# Patient Record
Sex: FEMALE | Race: WHITE | NOT HISPANIC OR LATINO | Employment: FULL TIME | URBAN - METROPOLITAN AREA
[De-identification: names, ages, dates, MRNs, and addresses within clinical notes are randomized per-mention and may not be internally consistent; named-entity substitution may affect disease eponyms.]

---

## 2021-03-03 ENCOUNTER — TREATMENT (OUTPATIENT)
Dept: PHYSICAL THERAPY | Facility: CLINIC | Age: 31
End: 2021-03-03

## 2021-03-03 DIAGNOSIS — R10.2 PELVIC PAIN: ICD-10-CM

## 2021-03-03 DIAGNOSIS — M46.1 SACROILIITIS (HCC): Primary | ICD-10-CM

## 2021-03-03 DIAGNOSIS — R27.9 LACK OF COORDINATION: ICD-10-CM

## 2021-03-03 PROCEDURE — 97162 PT EVAL MOD COMPLEX 30 MIN: CPT | Performed by: PHYSICAL THERAPIST

## 2021-03-03 PROCEDURE — 97112 NEUROMUSCULAR REEDUCATION: CPT | Performed by: PHYSICAL THERAPIST

## 2021-03-03 NOTE — PROGRESS NOTES
PHYSICAL THERAPY INITIAL EVALUATION    Patient: Tosha Thurston   : 1990  Diagnosis/ICD-10 Code:  Sacroiliitis (CMS/[facility]) [M46.1]  Referring practitioner: No ref. provider found  Date of Initial Visit: Type: THERAPY  Noted: 3/3/2021  Today's Date: 3/3/2021  Patient seen for 1 sessions             FEMALE NIH-CPSI 15/44 (higher number indicated increased dysfunction)      Subjective Patient is a 31 year-old reporting a several year history with recent exacerbation of sacroiliac pain and pelvic/perineal pain which is sometimes on left, sometimes on right, episodic in nature, (indicates L PSIS with Alma's sign) and states pain radiates into buttock but denies radicular symptoms, aggravated by sitting; also experiencing pelvic/perineal pain which is aggravated by defecation (states currently straining to move bowels and long term history of constipation), yearly well woman check, and intercourse, currently (prior to pregnancy) not using tampons (per personal preference) but  discomfort with use in past. Pain  3/10 on average. Due to symptoms now presents for PT evaluation. Patient's goal for therapy is to address her pain, have bowel movements more easily, and to be able to participate in recreational wellness exercise without pain.      Medical history reviewed with patient and includes current pregnancy TIBURCIO 21 (12 weeks pregnant at time of eval, states standard prenatal care with [facility], seeing every four weeks, no precautions), additional medical history includes bicuspid aortic valve (followed by cardiologist, states to see 3/5, no current precautions), history of long term constipation. Medication list in chart.   Work/social history: . Lives with  in [facility].    Activity level: exercise videos for Pilates and yoga (modifying for pregnancy); history of dance, competitive cheerleading     Objective     Postural Observations  Slight anterior pelvic tilt, rounded shoulders, and increased lumbar lordosis  in standing, potential R rib hump noted with forward flexion   In sitting, weight shift to R ischium  R ant innom, L posterior innom, negative pubic shotgun   Lumbar AROM: pain in left PSIS with return to extension, pain with flexion but unable to specifiy at where within range, extension and left lateral flexion 50% WFLs due to L PSIS pain.       Palpation    Hypertonic and tenderness left gluteus medius and piriformis.     Passive Range of Motion;  With 90/90 test, B hip er 80d, HS WFLs     Strength/Myotome Testing      Left Hip   Planes of Motion   Flexion:4  Extension: 4  Abduction: 4  Testing reproduces L SIJ pain     Right Hip   Planes of Motion   Flexion: 5  Extension: 5  Abduction: 5        Muscle Activation     Additional Muscle Activation Details  Fair diaphragmatic excursion with relaxed breathing   Fair TA activation upon attempt, overuse substitution from B obliques and respiratory diaphragm      See Exercise, Manual, and Modality Logs for complete treatment.      Assessment & Plan     Assessment  Impairments: abnormal coordination, abnormal muscle firing, abnormal muscle tone, impaired physical strength, lacks appropriate home exercise program and pain with function  Assessment details: Assessment: Patient presents for evaluation and treatment of sacroiliiac and pelvic pain with  today's assessment revealing pelvic positional faults, motor control/coordination deficits, and soft tissue tone/tenderness, all of which are  impacting her function and need to be addressed with skilled physical therapy treatment. Prognosis for PT is good given current musculoskeletal findings and health status; potential impacts to physical therapy plan of care include pregnancy as an evolving condition and underlying cardiac condition (Pt to f/u on upcoming medical consult to determine if POC to be altered/medical  clearance needed).Goals and plan were discussed with the patient with the patient expressing understanding and agreement with the proposed plan of care.          Patient was instructed in clinical findings of today's encounter, short term HEP including diaphragmatic breathing, seated alternating isometric hip extension, movement and ADL guidelines to promote pelvic stability (no increase in pain after today's Rx), goals and plan of care, musculoskeletal and general pregnancy precautions, lifestyle adaptations/habits including avoidance of Valsalva, ADL performance to improve symptoms and improve lumbopelvic stability, and potential and expected response to Rx. Patient expressed understanding of instruction given.   Prognosis: good  Functional Limitations: uncomfortable because of pain and sitting  Goals  Plan Goals: Short Term Goals (to be achieved in 30 days)  1) Outcome measure (Female NIH-CPSI score) score  <12 /44 to represent overall improved function  2) Independent in short term home exercise program and activity modification to improve symptoms and pelvic stability    Long Term Goals (to be achieved within 90 days)   1) Outcome measure Female NIH-CPSI score) score  < 8/44 to represent overall improved function  2) > good lumbopelvic muscle coordination to represent improved function (sitting, self care)  3) Patient to report >40% overall improvement in symptoms  4) Patient independent in long term home exercise program and ADL modifications     Plan  Therapy options: will be seen for skilled physical therapy services  Planned modality interventions: cryotherapy  Planned therapy interventions: abdominal trunk stabilization, manual therapy, motor coordination training, neuromuscular re-education, soft tissue mobilization, spinal/joint mobilization, strengthening, therapeutic activities, home exercise program, joint mobilization, functional ROM exercises and flexibility  Frequency: 1-2X per week.        Visit  Diagnoses:    ICD-10-CM ICD-9-CM   1. Sacroiliitis (CMS/[facility])  M46.1 720.2   2. Pelvic pain  R10.2 [id number]   3. Lack of coordination  R27.9 781.3       Timed:  Manual Therapy:         mins  90786;  Therapeutic Exercise:         mins  46857;     Neuromuscular Anaya:    10    mins  14507;    Therapeutic Activity:          mins  24106;     Gait Training:           mins  75627;     Ultrasound:          mins  61349;    Electrical Stimulation:         mins  04024 ( );    Untimed:  Electrical Stimulation:         mins  95169 ( );  Mechanical Traction:         mins  67223;   Dry Needling          mins self-pay  Traction          mins 87399  Low Eval          Mins  96228  Mod Eval      35    Mins  71578  High Eval                            Mins  80319  Re-Eval                               mins  54873    Timed Treatment:   10   mins   Total Treatment:     45   mins    PT SIGNATURE: RADHA Vega PT, DPT, WCS   DATE TREATMENT INITIATED: 3/3/2021    Initial Certification  Certification Period: 6/1/2021  I certify that the therapy services are furnished while this patient is under my care.  The services outlined above are required by this patient, and will be reviewed every 90 days.     PHYSICIAN: (self referred/direct access)    DATE:     Please sign and return via fax to [phone]. Thank you, [facility].

## 2021-03-10 ENCOUNTER — TREATMENT (OUTPATIENT)
Dept: PHYSICAL THERAPY | Facility: CLINIC | Age: 31
End: 2021-03-10

## 2021-03-10 DIAGNOSIS — R27.9 LACK OF COORDINATION: ICD-10-CM

## 2021-03-10 DIAGNOSIS — M46.1 SACROILIITIS (HCC): Primary | ICD-10-CM

## 2021-03-10 DIAGNOSIS — R10.2 PELVIC PAIN: ICD-10-CM

## 2021-03-10 PROCEDURE — 97110 THERAPEUTIC EXERCISES: CPT | Performed by: PHYSICAL THERAPIST

## 2021-03-10 PROCEDURE — 97140 MANUAL THERAPY 1/> REGIONS: CPT | Performed by: PHYSICAL THERAPIST

## 2021-03-10 PROCEDURE — 97112 NEUROMUSCULAR REEDUCATION: CPT | Performed by: PHYSICAL THERAPIST

## 2021-03-10 NOTE — PROGRESS NOTES
"                                                          PHYSICAL THERAPY DAILY PROGRESS NOTE    Patient: Tosha Thurston   : 1990  Diagnosis/ICD-10 Code:  Sacroiliitis (CMS/[facility]) [M46.1]  Referring practitioner: Self Referring  Date of Initial Visit: Type: THERAPY  Noted: 3/3/2021  Today's Date: 3/10/2021  Patient seen for 2 sessions           Subjective Saw cardiologist, \"everything looks good:, no precautions and \"exercise is good\"; to see midwife next week. States has been doing diaphragmatic breathing and it helps with less straining to move bowels. Left hip pain with activity improved slightly \"but it comes and goes\".Mild pain prior to Rx (indicates L PSIS), improved after Rx.          Objective R ant L post innom, neg pub shot, ttp L glute med and piriformis    See Exercise, Manual, and Modality Logs for complete treatment.     Patient was educated in updated HEP with handouts/supplies provided, review of general pregnancy precautions, expected and potential response to Rx, review of proper transfers, lifting and Valsalva avoidance, potential options for bracing dependent upon symptom progression/improvement, and goals and plan were reviewed with the patient expressing understanding/agreement.      Assessment/Plan Today's treatment focused on pelvic alignment correction and stabilization exercises with patient tolerant to treatment without increase in pain and with post Rx improvement in pain noted. Needs to continue skilled PT to continue progress towards goals and to address symptoms limiting function.    Plan: Progress TA w hip abd, add, rows as indicated, reassess pelvic position and Rx accordingly/as tolerated.       Visit Diagnoses:    ICD-10-CM ICD-9-CM   1. Sacroiliitis (CMS/[facility])  M46.1 720.2   2. Pelvic pain  R10.2 [id number]   3. Lack of coordination  R27.9 781.3       Timed:  Manual Therapy:    20     mins  17477;  Therapeutic Exercise:    8     mins  40638;     Neuromuscular Anaya:   14     mins  " 88107;    Therapeutic Activity:          mins  40090;     Gait Training:           mins  21073;     Ultrasound:          mins  27089;    Electrical Stimulation:         mins  28998 ( );    Untimed:  Electrical Stimulation:         mins  97259 ( );  Mechanical Traction:         mins  41105;   Dry Needling          mins self-pay  Traction          mins 78471  Low Eval          Mins  09858  Mod Eval         Mins  91170  High Eval                            Mins  75037  Re-Eval                               mins  94113    Timed Treatment:  42    mins   Total Treatment:     42   mins    PT SIGNATURE: RADHA Vega PT, DPT, WCS   DATE: 3/10/2021

## 2021-03-10 NOTE — PATIENT INSTRUCTIONS
Access Code: [email or web address]   URL: [email or web address]   Date: 03/10/2021   Prepared by: Radha Vega     Program Notes   Gentle abdominal contraction as you exhale NO PAIN     Exercises   Seated Transversus Abdominis Bracing - 12 reps - 1 sets - 2 hold - 1x daily - 7x weekly   Seated Piriformis Stretch with Trunk Bend - 2 reps - 1 sets - 20 hold - 1x daily - 7x weekly   Seated Hip Abduction with Resistance - 12 reps - 1 sets - 2x daily - 7x weekly

## 2021-03-17 ENCOUNTER — TREATMENT (OUTPATIENT)
Dept: PHYSICAL THERAPY | Facility: CLINIC | Age: 31
End: 2021-03-17

## 2021-03-17 DIAGNOSIS — R27.9 LACK OF COORDINATION: ICD-10-CM

## 2021-03-17 DIAGNOSIS — R10.2 PELVIC PAIN: ICD-10-CM

## 2021-03-17 DIAGNOSIS — M46.1 SACROILIITIS (HCC): Primary | ICD-10-CM

## 2021-03-17 PROCEDURE — 97140 MANUAL THERAPY 1/> REGIONS: CPT | Performed by: PHYSICAL THERAPIST

## 2021-03-17 PROCEDURE — 97112 NEUROMUSCULAR REEDUCATION: CPT | Performed by: PHYSICAL THERAPIST

## 2021-03-17 PROCEDURE — 97110 THERAPEUTIC EXERCISES: CPT | Performed by: PHYSICAL THERAPIST

## 2021-03-17 NOTE — PATIENT INSTRUCTIONS
Access Code: [email or web address]: [email or web address]: 03/17/2021Prepared by: Radha Pink EnsorExerccharles   Seated Shoulder Row with Resistance Anchored at Feet - 1 x daily - 7 x weekly - 1 sets - 12 reps - 2 hold

## 2021-03-17 NOTE — PROGRESS NOTES
PHYSICAL THERAPY DAILY PROGRESS NOTE    Patient: Tosha Thurston   : 1990  Diagnosis/ICD-10 Code:  Sacroiliitis (CMS/HCC) [M46.1]  Referring practitioner: Self Referring  Date of Initial Visit: Type: THERAPY  Noted: 3/3/2021  Today's Date: 3/17/2021  Patient seen for 3 sessions           Subjective Pain with sitting and intercourse pain improved, 3/10 and not occurring as often as prior to attending PT, no problems with exercises, some pain in (points to PSIS) with driving.Saw midwife two days, returns in 4 weeks and to undergo standard US, 14 weeks today.           Objective R ant L post innom, neg pub shot, ttp L glute med and piriformis. Pain with resisted L hip abduction (concordant sign) prior to Rx, no pain after Rx.  See Exercise, Manual, and Modality Logs for complete treatment.     Patient was educated in updated HEP with handouts/supplies provided, expected and potential response to Rx, possible adaptations to car seat  and goals and plan were reviewed with the patient expressing understanding/agreement. Instructed in/reviewed proper activation/position/pattern with transitions, work activities, and ADLs, including proper sitting posture. Also discussed potnential indications for Serola/SI brace depending upon progress/symptom improvement.         Assessment/Plan Today's treatment focused on addressing pelvic positional faults and soft tissue contributions to pain and progression of stabilizing exercises with patient tolerant to treatment without increase in pain and with improved post vs pre test signs noted. Needs to continue skilled PT to continue progress towards goals and to address symptoms limiting function.     Plan: Progress to standing core stab as tolerated.     Visit Diagnoses:    ICD-10-CM ICD-9-CM   1. Sacroiliitis (CMS/HCC)  M46.1 720.2   2. Pelvic pain  R10.2 [id number]   3. Lack of coordination  R27.9 781.3       Timed:  Manual  Therapy:   18     mins  26519;  Therapeutic Exercise:    8     mins  83838;     Neuromuscular Anaya:    18    mins  58754;    Therapeutic Activity:          mins  57358;     Gait Training:           mins  27413;     Ultrasound:          mins  68529;    Electrical Stimulation:         mins  94095 ( );    Untimed:  Electrical Stimulation:         mins  09668 ( );  Mechanical Traction:         mins  98364;   Dry Needling          mins self-pay  Traction          mins 60853  Low Eval          Mins  82910  Mod Eval          Mins  60504  High Eval                            Mins  17355  Re-Eval                               mins  13520    Timed Treatment:  46    mins   Total Treatment:    46    mins    PT SIGNATURE: RADHA Vega, PT, DPT, WCS   DATE: 3/17/2021

## 2021-03-24 ENCOUNTER — TREATMENT (OUTPATIENT)
Dept: PHYSICAL THERAPY | Facility: CLINIC | Age: 31
End: 2021-03-24

## 2021-03-24 DIAGNOSIS — M46.1 SACROILIITIS (HCC): Primary | ICD-10-CM

## 2021-03-24 DIAGNOSIS — R27.9 LACK OF COORDINATION: ICD-10-CM

## 2021-03-24 DIAGNOSIS — R10.2 PELVIC PAIN: ICD-10-CM

## 2021-03-24 PROCEDURE — 97112 NEUROMUSCULAR REEDUCATION: CPT | Performed by: PHYSICAL THERAPIST

## 2021-03-24 PROCEDURE — 97140 MANUAL THERAPY 1/> REGIONS: CPT | Performed by: PHYSICAL THERAPIST

## 2021-03-24 PROCEDURE — 97110 THERAPEUTIC EXERCISES: CPT | Performed by: PHYSICAL THERAPIST

## 2021-03-24 NOTE — PATIENT INSTRUCTIONS
Access Code: [email or web address]: [email or web address]/Date: 03/24/2021Prepared by: Radha Pink EnsorProgram Notes   NO PAIN. slight posterior pelvic tilt and engage abdominals and glutes prior to movement.   For split stance, front foot heel aligned with back foot toes, feet hip width apart   Exercises   Split Stance Shoulder Row with Resistance - 1 x daily - 7 x weekly - 1 sets - 12 reps   Standing Single Arm Low Row with Anchored Resistance - 1 x daily - 7 x weekly - 1 sets - 12 reps

## 2021-03-24 NOTE — PROGRESS NOTES
"Physical Therapy Daily Progress Note      Patient: Tosha Thurston   : 1990  Referring practitioner: Self Referring  Date of Initial Visit: Type: THERAPY  Noted: 3/3/2021  Today's Date: 3/24/2021  Patient seen for 4 sessions         Subjective: States not having to push and strain as much with bowel movements, can do exercises to address pain, still having pain with intercourse \"maybe little better\". 15 weeks gestation today.       Objective R ant L post innom, neg pub shot, ttp L glute med and piriformis  See Exercise, Manual, and Modality Logs for complete treatment.     Patient was educated in updated HEP with handouts/supplies provided, expected and potential response to Rx, and goals and plan were reviewed with the patient expressing understanding/agreement. Instructed in/reviewed proper transition/transfer technique and reviewed pregnancy precautions with patient expressing understanding/agreement    A: Today's treatment focused on progression of stability/motor control/postural exercises with patient tolerant to treatment without increase in pain and with  proper activation/sequencing/technique noted.Needs to continue skilled PT to continue progress towards goals and to address symptoms limiting function.     Plan: Progress per Plan of Care ; plan to progress to anti rotation/Paloff progression and add pec stretch next Rx            Timed:         Manual Therapy:    15     mins  23633;     Therapeutic Exercise:    8     mins  93951;     Neuromuscular Anaya:   20    mins  78427;    Therapeutic Activity:          mins  39627;     Gait Training:           mins  60118;     Ultrasound:          mins  60330;    Ionto                                   mins   99695  Self Care                            mins   00985      Un-Timed:  Electrical Stimulation:         mins  76361 ( );  Dry Needling          mins self-pay  Traction          mins 04782  Low Eval          Mins  05692  Mod Eval          Mins  " 82227  High Eval                            Mins  93213  Re-Eval                               mins  69450          Canalith Repos                   mins  60732    Timed Treatment:  43    mins   Total Treatment:    43    mins    RADHA Vega PT, DPT, WCS  Physical Therapist

## 2021-03-31 ENCOUNTER — TREATMENT (OUTPATIENT)
Dept: PHYSICAL THERAPY | Facility: CLINIC | Age: 31
End: 2021-03-31

## 2021-03-31 DIAGNOSIS — M46.1 SACROILIITIS (HCC): Primary | ICD-10-CM

## 2021-03-31 DIAGNOSIS — R27.9 LACK OF COORDINATION: ICD-10-CM

## 2021-03-31 DIAGNOSIS — R10.2 PELVIC PAIN: ICD-10-CM

## 2021-03-31 PROCEDURE — 97112 NEUROMUSCULAR REEDUCATION: CPT | Performed by: PHYSICAL THERAPIST

## 2021-03-31 PROCEDURE — 97140 MANUAL THERAPY 1/> REGIONS: CPT | Performed by: PHYSICAL THERAPIST

## 2021-03-31 PROCEDURE — 97110 THERAPEUTIC EXERCISES: CPT | Performed by: PHYSICAL THERAPIST

## 2021-03-31 NOTE — PROGRESS NOTES
Physical Therapy Re-Assessment / Re-Certification        Patient: Tosha Thurston   : 1990  Diagnosis/ICD-10 Code:  Sacroiliitis (CMS/[facility]) [M46.1]  Referring practitioner: Self Referring  Date of Initial Visit: Type: THERAPY  Noted: 3/3/2021  Today's Date: 3/31/2021  Patient seen for 5 sessions      Subjective:     Subjective Questionnaire: FEMALE NIH-CPSI 17/44 (higher number indicated increased dysfunction)  Clinical Progress: improved  Home Program Compliance: Yes  Treatment has included: therapeutic exercise, neuromuscular re-education, manual therapy and therapeutic activity self care/pt education    Subjective States low back (sacroiliac) pain is present after activity and sometimes if she sits too long, 3/10 when present, but sometimes there is not pain and can do exercises as instructed to address pain. States due to scheduling conflicts did not have time to do exercises as often as she should this past week. No changes re: status of pregnancy, to see midwife group in 2 weeks. 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 Agreeable to proposed plan of care.         Objective   Postural Observations:  Positive Stork L, Posterior, R anterior innom, neg pubic shotgun     Palpation    Hypertonic and tenderness left gluteus medius and piriformis.     Passive Range of Motion;  With 90/90 test, B hip er 80d, HS WFLs      Strength/Myotome Testing      Left Hip   Planes of Motion   Extension: 5  Abduction: 5  Testing of abduction reproduces L SIJ pain     Right Hip   Planes of Motion   Flexion: 5  Extension: 5  Abduction: 5        Muscle Activation     Additional Muscle Activation Details  Fair diaphragmatic excursion with relaxed breathing   Fair TA activation upon attempt, overuse substitution from B obliques and respiratory diaphragm     See Exercise, Manual, and Modality Logs for complete treatment.     Pt Ed: Pt was instructed in results of today's reassessmentupdated  HEP (seated QL, table to cat qped, qped TA), reviewed pregnancy precautions, activity and exercise guidelines, expected and potential response to Rx, and review of plan of care and goals. Pt expressed understanding of instruction given.     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 She demonstrates improved strength and motor control but needs to continue PT to address pelvic positional faults and pain limiting activity tolerance.with additional time needed to meet goals due to evolving changing condition (pregnancy) affecting musculoskeletal structures and potentially impacting progress. See goal status below. Goals and plan were discussed with the patient with the patient expressing understanding/agreement.    Visit Diagnoses:    ICD-10-CM ICD-9-CM   1. Sacroiliitis (CMS/[facility])  M46.1 720.2   2. Pelvic pain  R10.2 [id number]   3. Lack of coordination  R27.9 781.3       Progress toward previous goals: Partially Met    Goals  Short Term Goals (to be achieved in 30 days)  1) Outcome measure (Female NIH-CPSI score) score  <12 /44 to represent overall improved function 17/44 as of 3/31/21  2) Independent in short term home exercise program and activity modification to improve symptoms and pelvic stability MET    Long Term Goals (to be achieved within 90 days)   1) Outcome measure Female NIH-CPSI score) score  < 8/44 to represent overall improved function- 17/44 as of 3/31/21  2) > good lumbopelvic muscle coordination to represent improved function (sitting, self care)- improved  3) Patient to report >40% overall improvement in symptoms- needs additional time to meet goal   4) Patient independent in long term home exercise program and ADL modifications - needs additional time to meet goal        Recommendations: Continue as planned  Timeframe: 1 month; plan then to be reassessed  Prognosis to achieve goals: good    PT Signature: RADHA Vega, PT , DPT, WCS      Based upon review of the patient's progress and continued therapy plan, it is my medical opinion that Tosha Thurston should continue physical therapy treatment at [facility]  [address] [facility], [address] [address] [phone].    Signature: __________________________________  Referring, Self    Timed:  Manual Therapy:    18     mins  03993;  Therapeutic Exercise:     8    mins  92982;     Neuromuscular Anaya:   15     mins  05779;    Therapeutic Activity:          mins  80942;     Gait Training:           mins  76746;     Ultrasound:          mins  93921;    Electrical Stimulation:         mins  63953 ( );  Ultrasound:          mins  88322;    Ionto                                   mins   68717  Self Care                            mins   17910    Untimed:  Electrical Stimulation:         mins  43277 ( );  Mechanical Traction:         mins  06634;   Dry Needling          mins self-pay  Low Eval          Mins  08846  Mod Eval          Mins  81925  High Eval                            Mins  97935  Re-Eval                               mins  11004    Timed Treatment: 41     mins   Total Treatment:    41    mins

## 2021-04-07 ENCOUNTER — TREATMENT (OUTPATIENT)
Dept: PHYSICAL THERAPY | Facility: CLINIC | Age: 31
End: 2021-04-07

## 2021-04-07 DIAGNOSIS — R10.2 PELVIC PAIN: ICD-10-CM

## 2021-04-07 DIAGNOSIS — M46.1 SACROILIITIS (HCC): Primary | ICD-10-CM

## 2021-04-07 DIAGNOSIS — R27.9 LACK OF COORDINATION: ICD-10-CM

## 2021-04-07 PROCEDURE — 97140 MANUAL THERAPY 1/> REGIONS: CPT | Performed by: PHYSICAL THERAPIST

## 2021-04-07 PROCEDURE — 97110 THERAPEUTIC EXERCISES: CPT | Performed by: PHYSICAL THERAPIST

## 2021-04-07 PROCEDURE — 97112 NEUROMUSCULAR REEDUCATION: CPT | Performed by: PHYSICAL THERAPIST

## 2021-04-07 NOTE — PROGRESS NOTES
PHYSICAL THERAPY DAILY PROGRESS NOTE    Patient: Tosha Thurston   : 1990  Diagnosis/ICD-10 Code:  Sacroiliitis (CMS/[facility]) [M46.1]  Referring practitioner: Self Referring  Date of Initial Visit: Type: THERAPY  Noted: 3/3/2021  Today's Date: 2021  Patient seen for 6 sessions           Subjective States pain has been better, had questions about sleeping position; expressed understanding of instruction given. States had occassional (indicates R PSIS area)  Pain during past week, pain curently mild pain L hip pain (points to PSIS and L hip flexor) prior to Rx, reports 75% improvement after Rx.         Objective R ant innom, L post innom, negative pubic shotgun    See Exercise, Manual, and Modality Logs for complete treatment.     Patient was educated in updated HEP(added resisted clam in/outflare correction, qped w LE ext, seated hip flexor stretch)  with handouts/supplies provided, expected and potential response to Rx, potential sleeping positions, review of pregnancy precautions and ADL and exercise guidelines, indication for bracing, and goals and plan were reviewed with the patient expressing understanding/agreement.     Assessment/Plan Today's treatment focused on addressing positional faults and progression of stabilizing exercises with patient tolerant to treatment without increase in pain and with improved post vs preRx pain noted. Needs to continue skilled PT to continue progress towards goals and to address symptoms limiting function.    Plan: Continue to progress qped series, progress to standing, add inflare outflare correction as needed      Visit Diagnoses:    ICD-10-CM ICD-9-CM   1. Sacroiliitis (CMS/HCC)  M46.1 720.2   2. Pelvic pain  R10.2 [id number]   3. Lack of coordination  R27.9 781.3       Timed:  Manual Therapy:    11     mins  80123;  Therapeutic Exercise:    11     mins  49861;     Neuromuscular Anaya:    22    mins  15489;     Therapeutic Activity:          mins  52621;     Gait Training:           mins  20873;     Ultrasound:          mins  13744;    Electrical Stimulation:         mins  95084 ( );    Untimed:  Electrical Stimulation:         mins  01940 ( );  Mechanical Traction:         mins  67098;   Dry Needling          mins self-pay  Traction          mins 04863  Low Eval          Mins  24904  Mod Eval          Mins  34599  High Eval                            Mins  28849  Re-Eval                               mins  90260    Timed Treatment:  44    mins   Total Treatment:    44    mins    PT SIGNATURE: RADHA Vega PT, DPT, WCS   DATE: 4/7/2021

## 2021-04-14 ENCOUNTER — TREATMENT (OUTPATIENT)
Dept: PHYSICAL THERAPY | Facility: CLINIC | Age: 31
End: 2021-04-14

## 2021-04-14 DIAGNOSIS — R27.9 LACK OF COORDINATION: ICD-10-CM

## 2021-04-14 DIAGNOSIS — R10.2 PELVIC PAIN: ICD-10-CM

## 2021-04-14 DIAGNOSIS — M46.1 SACROILIITIS (HCC): Primary | ICD-10-CM

## 2021-04-14 PROCEDURE — 97112 NEUROMUSCULAR REEDUCATION: CPT | Performed by: PHYSICAL THERAPIST

## 2021-04-14 PROCEDURE — 97140 MANUAL THERAPY 1/> REGIONS: CPT | Performed by: PHYSICAL THERAPIST

## 2021-04-14 PROCEDURE — 97110 THERAPEUTIC EXERCISES: CPT | Performed by: PHYSICAL THERAPIST

## 2021-04-16 ENCOUNTER — BULK ORDERING (OUTPATIENT)
Dept: CASE MANAGEMENT | Facility: OTHER | Age: 31
End: 2021-04-16

## 2021-04-16 DIAGNOSIS — Z23 IMMUNIZATION DUE: ICD-10-CM

## 2021-04-21 ENCOUNTER — TREATMENT (OUTPATIENT)
Dept: PHYSICAL THERAPY | Facility: CLINIC | Age: 31
End: 2021-04-21

## 2021-04-21 DIAGNOSIS — R27.9 LACK OF COORDINATION: ICD-10-CM

## 2021-04-21 DIAGNOSIS — R10.2 PELVIC PAIN: ICD-10-CM

## 2021-04-21 DIAGNOSIS — M46.1 SACROILIITIS (HCC): Primary | ICD-10-CM

## 2021-04-21 PROCEDURE — 97110 THERAPEUTIC EXERCISES: CPT | Performed by: PHYSICAL THERAPIST

## 2021-04-21 PROCEDURE — 97535 SELF CARE MNGMENT TRAINING: CPT | Performed by: PHYSICAL THERAPIST

## 2021-04-21 PROCEDURE — 97140 MANUAL THERAPY 1/> REGIONS: CPT | Performed by: PHYSICAL THERAPIST

## 2021-04-21 PROCEDURE — 97112 NEUROMUSCULAR REEDUCATION: CPT | Performed by: PHYSICAL THERAPIST

## 2021-04-21 NOTE — PROGRESS NOTES
"                                                          PHYSICAL THERAPY DAILY PROGRESS NOTE    Patient: Tosha Thurston   : 1990  Diagnosis/ICD-10 Code:  Sacroiliitis (CMS/HCC) [M46.1]  Referring practitioner: Self Referring  Date of Initial Visit: Type: THERAPY  Noted: 3/3/2021  Today's Date: 2021  Patient seen for 8 sessions             Subjective Saw midwife \"everything looks good\", had US and to have repeat US to look at placental placement and to look at chromosomes (as a precaution per pt report), no limitations or changes in activity. States having hip pain (indicates L anterior hip) in certain positions when tryng to sleep but can do stretch as instructed to address, pain overall improved.           Objective   19 weeks gestation   R ant L posterior innom, ttp L glute med, pain reproduced with resisted hip abduction prior to rx, no pain after  See Exercise, Manual, and Modality Logs for complete treatment.     Patient and PT reviewed general pregnancy precautions, sleep position, techniques to manage symptoms, and ideal posture/correction of postural faults, proper defecation and urination position/technique, and expected and potential response to Rx, and goals and plan were reviewed with the patient expressing understanding/agreement.   Assessment/Plan Improved post vs pre Rx pain and tolerant to treatment without exarcerbation of pain; needs to continue skilled PT to continue to address symptoms and motor control deficits secondary to/impacted by pregnancy.  Plan: Progress qped and to standing next Rx.    Visit Diagnoses:    ICD-10-CM ICD-9-CM   1. Sacroiliitis (CMS/HCC)  M46.1 720.2   2. Pelvic pain  R10.2 [id number]   3. Lack of coordination  R27.9 781.3       Timed:  Manual Therapy:    18     mins  13532;  Therapeutic Exercise:    8     mins  30964;     Neuromuscular Anaya:   17     mins  19634;    Therapeutic Activity:          mins  59932;     Gait Training:           mins  44463;   "   Ultrasound:          mins  04255;    Electrical Stimulation:         mins  62745 ( );  Self-care  __8__ mins 03800    Untimed:  Electrical Stimulation:         mins  72474 ( );  Mechanical Traction:         mins  83614;   Dry Needling          mins self-pay  Traction          mins 83774  Low Eval          Mins  84004  Mod Eval          Mins  95946  High Eval                            Mins  57111  Re-Eval                               mins  03111    Timed Treatment:  51    mins   Total Treatment:    51    mins    PT SIGNATURE: RADHA Vega PT, DPT, WCS   DATE: 4/21/2021

## 2021-04-28 ENCOUNTER — TREATMENT (OUTPATIENT)
Dept: PHYSICAL THERAPY | Facility: CLINIC | Age: 31
End: 2021-04-28

## 2021-04-28 DIAGNOSIS — R27.9 LACK OF COORDINATION: ICD-10-CM

## 2021-04-28 DIAGNOSIS — R10.2 PELVIC PAIN: ICD-10-CM

## 2021-04-28 DIAGNOSIS — M46.1 SACROILIITIS (HCC): Primary | ICD-10-CM

## 2021-04-28 PROCEDURE — 97140 MANUAL THERAPY 1/> REGIONS: CPT | Performed by: PHYSICAL THERAPIST

## 2021-04-28 PROCEDURE — 97110 THERAPEUTIC EXERCISES: CPT | Performed by: PHYSICAL THERAPIST

## 2021-04-28 PROCEDURE — 97112 NEUROMUSCULAR REEDUCATION: CPT | Performed by: PHYSICAL THERAPIST

## 2021-04-28 NOTE — PATIENT INSTRUCTIONS
Access Code: [email or web address]: [email or web address]: 04/28/2021Prepared by: Radha Pink EnsorExerccharles   Corner Pec Major Stretch - 2 x daily - 4 x weekly - 1 sets - 2 reps - 20 hold   Seated Shoulder Horizontal Abduction with Resistance - 1 x daily - 4 x weekly - 1 sets - 12 reps - 2 hold

## 2021-04-28 NOTE — PROGRESS NOTES
PT Re-Assessment / Re-Certification        Patient: Tosha Thurston   : 1990  Diagnosis/ICD-10 Code:  Sacroiliitis (CMS/HCC) [M46.1]  Referring practitioner: Self Referring  Date of Initial Visit: Type: THERAPY  Noted: 3/3/2021  Today's Date: 2021  Patient seen for 9 sessions      Subjective:     Subjective Questionnaire: FEMALE NIH-CPSI: 18/44 (higher number indicated increased dysfunction)  Clinical Progress: improved  Home Program Compliance: Yes  Treatment has included: therapeutic exercise, neuromuscular re-education, manual therapy and therapeutic activity self care/pt education    Subjective No new news/report re: medical status (has not seen midwife group) since prior PT appointment. To see midwife next week and to undergo fetal US and maternal US (due to maternal cardiac hx).  Feels hip/pelvic/low back pain is improving, when present is more on L vs R, less pain moving between sitting to standing, occasional constipation but can position and use breathing techniques to avoid pushing and straining, less pain when trying to find a sleeping position; overall 25% improved. Agreeable to proposed plan of care.       Objective 20 weeks gestation  Postural Observations:  Lumbar AROM WFL's except R lat flexion tight at end range   L Posterior, R anterior innom, neg pubic shotgun     Palpation    Hypertonic and tenderness left gluteus medius and piriformis.     Strength/Myotome Testing      Left Hip   Planes of Motion   Flexion: 5  Extension: 5  Abduction: 5       Right Hip   Planes of Motion   Flexion: 5  Extension: 5  Abduction: 5        Muscle Activation     Additional Muscle Activation Details  Fair diaphragmatic excursion with relaxed breathing   Good TA activation upon attemp     See Exercise, Manual, and Modality Logs for complete treatment.      Pt Ed: Pt was instructed in results of today's reassessmentupdated HEP (doorway/corner pec stretch and horiz sh abd), reviewed pregnancy precautions, activity  and exercise guidelines and monitoring for tolerance by RPE, expected and potential response to Rx, options for bracing, and review of plan of care and goals. Pt expressed understanding of instruction given.     Assessment/Plan     Patient is attending weekly PT to address pregnancy related pain with a positive response as evidenced by her improved pain, improved activity tolerance, and improved motor control and strength. She needs to continue skilled PT to address pelvic positional faults and pain limiting activity tolerance.with additional time needed to meet goals due to evolving changing condition (pregnancy) affecting musculoskeletal structures and potentially impacting progress. See goal status below. Goals and plan were discussed with the patient with the patient expressing understanding/agreement      Visit Diagnoses:    ICD-10-CM ICD-9-CM   1. Sacroiliitis (CMS/HCC)  M46.1 720.2   2. Pelvic pain  R10.2 [id number]   3. Lack of coordination  R27.9 781.3       Progress toward previous goals: Partially Met    Goals  Short Term Goals (to be achieved in 30 days)  1) Outcome measure (Female NIH-CPSI score) score  <12 /44 to represent overall improved function 18/44 as of 3/31/21  2) Independent in short term home exercise program and activity modification to improve symptoms and pelvic stability MET    Long Term Goals (to be achieved within 90 days)   1) Outcome measure Female NIH-CPSI score) score  < 8/44 to represent overall improved function- 18/44 as of 3/31/21  2) > good lumbopelvic muscle coordination to represent improved function (sitting, self care)- progressing  3) Patient to report >40% overall improvement in symptoms- progressing, 25% as of 4/28/21   4) Patient independent in long term home exercise program and ADL modifications - needs additional time to meet goal       Recommendations: Continue as planned; plan to be adjusted depending upon status/prgress, progress to standing hip/core stabilization as  able/tolerated  Timeframe: 1 month  Prognosis to achieve goals: good    PT Signature: RADHA Vega, PT , DPT, WCS      Based upon review of the patient's progress and continued therapy plan, it is my medical opinion that Tosha Thurston should continue physical therapy treatment at [facility]  [address] [place] [address]  [phone].    Signature: __________________________________  Referring, Self    Timed:  Manual Therapy:    19     mins  53383;  Therapeutic Exercise:    8     mins  98731;     Neuromuscular Anaya:    15    mins  31340;    Therapeutic Activity:          mins  43161;     Gait Training:           mins  26329;     Ultrasound:          mins  90817;    Electrical Stimulation:         mins  81884 ( );  Ultrasound:          mins  64088;    Ionto                                   mins   55850  Self Care                            mins   95067    Untimed:  Electrical Stimulation:         mins  46318 ( );  Mechanical Traction:         mins  39901;   Dry Needling          mins self-pay  Low Eval          Mins  17068  Mod Eval          Mins  88433  High Eval                            Mins  89942  Re-Eval                               mins  63133    Timed Treatment:  43    mins   Total Treatment:    43    mins

## 2021-05-12 ENCOUNTER — TREATMENT (OUTPATIENT)
Dept: PHYSICAL THERAPY | Facility: CLINIC | Age: 31
End: 2021-05-12

## 2021-05-12 DIAGNOSIS — M46.1 SACROILIITIS (HCC): Primary | ICD-10-CM

## 2021-05-12 DIAGNOSIS — R10.2 PELVIC PAIN: ICD-10-CM

## 2021-05-12 DIAGNOSIS — R27.9 LACK OF COORDINATION: ICD-10-CM

## 2021-05-12 PROCEDURE — 97140 MANUAL THERAPY 1/> REGIONS: CPT | Performed by: PHYSICAL THERAPIST

## 2021-05-12 PROCEDURE — 97112 NEUROMUSCULAR REEDUCATION: CPT | Performed by: PHYSICAL THERAPIST

## 2021-05-12 PROCEDURE — 97110 THERAPEUTIC EXERCISES: CPT | Performed by: PHYSICAL THERAPIST

## 2021-05-12 NOTE — PROGRESS NOTES
"                                                          PHYSICAL THERAPY DAILY PROGRESS NOTE    Patient: Tosha Thurston   : 1990  Diagnosis/ICD-10 Code:  Sacroiliitis (CMS/HCC) [M46.1]  Referring practitioner: Self Referring  Date of Initial Visit: Type: THERAPY  Noted: 3/3/2021  Today's Date: 2021  Patient seen for 10 sessions             Subjective Had fetal echo, \"heart looks good\" and saw midwife, progressing well and \"everything looks good\"; pain with intercourse and ADLs, has been good, 1-2/10 on average and able to sleep without pain, no new guidelines or restrictions. 22 weeks gestation.           Objective mild R ant innom    See Exercise, Manual, and Modality Logs for complete treatment.     Patient was educated in updated HEP (added PPT standing, gastrocs stretch and diagonals to horiz abd sh) expected and potential response to Rx, and goals and plan were reviewed with the patient expressing understanding/agreement. Instructed in/reviewed proper pelvic position/bracing with with work activities, ADLs, and self care..      Assessment/Plan Improvement in pain and tolerant to exercise progression; needs to continue skilled PT to continue progress towards goals and to address symptoms limiting function with plan to be reevaluated based on progress/status and adjusted as needed due to evolving status (pregnancy) .  Plan: Progress scap stab/postural exercises as indicated     Visit Diagnoses:    ICD-10-CM ICD-9-CM   1. Sacroiliitis (CMS/HCC)  M46.1 720.2   2. Pelvic pain  R10.2 [id number]   3. Lack of coordination  R27.9 781.3       Timed:  Manual Therapy:    12     mins  91305;  Therapeutic Exercise:    12     mins  40074;     Neuromuscular Anaya:   20     mins  21772;    Therapeutic Activity:          mins  30889;     Gait Training:           mins  36798;     Ultrasound:          mins  04906;    Electrical Stimulation:         mins  83788 ( );    Untimed:  Electrical Stimulation:         mins  " 98941 ( );  Mechanical Traction:         mins  00930;   Dry Needling          mins self-pay  Traction          mins 10811  Low Eval          Mins  46771  Mod Eval          Mins  75830  High Eval                            Mins  16577  Re-Eval                               mins  15053    Timed Treatment:  44    mins   Total Treatment:    44    mins    PT SIGNATURE: RADHA Vega, PT, DPT, WCS   DATE: 5/12/2021

## 2021-05-19 ENCOUNTER — TREATMENT (OUTPATIENT)
Dept: PHYSICAL THERAPY | Facility: CLINIC | Age: 31
End: 2021-05-19

## 2021-05-19 DIAGNOSIS — M46.1 SACROILIITIS (HCC): Primary | ICD-10-CM

## 2021-05-19 DIAGNOSIS — R27.9 LACK OF COORDINATION: ICD-10-CM

## 2021-05-19 DIAGNOSIS — R10.2 PELVIC PAIN: ICD-10-CM

## 2021-05-19 PROCEDURE — 97110 THERAPEUTIC EXERCISES: CPT | Performed by: PHYSICAL THERAPIST

## 2021-05-19 PROCEDURE — 97112 NEUROMUSCULAR REEDUCATION: CPT | Performed by: PHYSICAL THERAPIST

## 2021-05-19 PROCEDURE — 97140 MANUAL THERAPY 1/> REGIONS: CPT | Performed by: PHYSICAL THERAPIST

## 2021-05-19 NOTE — PATIENT INSTRUCTIONS
Access Code: [email or web address]: [email or web address]: 05/19/2021Prepared by: Radha Pink EnsorExercises   Seated Cervical Retraction - 3 x daily - 7 x weekly - 1 sets - 5 reps - 2 hold

## 2021-05-19 NOTE — PROGRESS NOTES
PHYSICAL THERAPY DAILY PROGRESS NOTE    Patient: Tosha Thurston   : 1990  Diagnosis/ICD-10 Code:  Sacroiliitis (CMS/HCC) [M46.1]  Referring practitioner: Self Referring  Date of Initial Visit: Type: THERAPY  Noted: 3/3/2021  Today's Date: 2021  Patient seen for 11 sessions           Subjective Pain is overall better, less often and less severe, and mostly in PSIS; feels pain is related to sitting position at work, expressed understanding of instruction given. 23 weeks gestation, no medical appointments since last visit, has US today to assess placental position.       Objective R ant L post innom, pubic symphysis shotgun negative.    See Exercise, Manual, and Modality Logs for complete treatment.     Patient was educated in updated HEP with handouts/supplies provided, expected and potential response to Rx, and goals and plan were reviewed with the patient expressing understanding/agreement. Instructed in/reviewed proper sitting posture with work activities and ADLs.      Assessment/Plan Continues to report improved pain levels overall; today's treatment focused on pelvic and head/shoulder/thoracic positional faults with patient tolerant to treatment without increase in pain. Needs to continue skilled PT to continue progress towards goals and to address symptoms limiting function.    Plan: Reassess pelvic position and chin tucks, progress PB as shane.       Visit Diagnoses:    ICD-10-CM ICD-9-CM   1. Sacroiliitis (CMS/HCC)  M46.1 720.2   2. Pelvic pain  R10.2 [id number]   3. Lack of coordination  R27.9 781.3       Timed:  Manual Therapy:    16     mins  23123;  Therapeutic Exercise:    8     mins  91070;     Neuromuscular Anaya:    20    mins  53313;    Therapeutic Activity:          mins  58817;     Gait Training:           mins  32015;     Ultrasound:          mins  25436;    Electrical Stimulation:         mins  09326 ( );    Untimed:  Electrical  Stimulation:         mins  59452 ( );  Mechanical Traction:         mins  89834;   Dry Needling          mins self-pay  Traction          mins 87303  Low Eval          Mins  19588  Mod Eval          Mins  43827  High Eval                            Mins  39010  Re-Eval                               mins  00449    Timed Treatment:  44    mins   Total Treatment:    44    mins    PT SIGNATURE: RADHA Vega, PT, DPT, WCS   DATE: 5/19/2021

## 2021-05-26 ENCOUNTER — TREATMENT (OUTPATIENT)
Dept: PHYSICAL THERAPY | Facility: CLINIC | Age: 31
End: 2021-05-26

## 2021-05-26 DIAGNOSIS — R10.2 PELVIC PAIN: ICD-10-CM

## 2021-05-26 DIAGNOSIS — M46.1 SACROILIITIS (HCC): Primary | ICD-10-CM

## 2021-05-26 DIAGNOSIS — R27.9 LACK OF COORDINATION: ICD-10-CM

## 2021-05-26 PROCEDURE — 97110 THERAPEUTIC EXERCISES: CPT | Performed by: PHYSICAL THERAPIST

## 2021-05-26 PROCEDURE — 97112 NEUROMUSCULAR REEDUCATION: CPT | Performed by: PHYSICAL THERAPIST

## 2021-05-26 PROCEDURE — 97140 MANUAL THERAPY 1/> REGIONS: CPT | Performed by: PHYSICAL THERAPIST

## 2021-05-26 NOTE — PROGRESS NOTES
PT Re-Assessment / Progress Update        Patient: Tosha Thurston   : 1990  Diagnosis/ICD-10 Code:  Sacroiliitis (CMS/HCC) [M46.1]  Referring practitioner: Self Referring  Date of Initial Visit: Type: THERAPY  Noted: 3/3/2021  Today's Date: 2021  Patient seen for 12 sessions      Subjective:     Subjective Questionnaire: FEMALE NIH-CPSI: 6/44 (higher number indicated increased dysfunction)  Clinical Progress: improved  Home Program Compliance: Yes  Treatment has included: therapeutic exercise, neuromuscular re-education, manual therapy and therapeutic activity self care/pt education    Subjective Better able to find a position of comfort for sleep, pain with intercourse,  ADLS, and sleep improved, overall 80% improved. Agreeable to proposed plan of care (to continue PT towards current goals).  Objective   24 weeks gestation; TIBURCIO 21  Postural Observations:  Mod forward head/rounded shoulder  Ant pelvic tilt/increased lumbar lordosis (consistent with pregnancy)   Lumbar AROM WFL's; does not produce pain   R ant, L post innom  Palpation    Hypertonic (mild) and tenderness left gluteus medius and piriformis.     Strength/Myotome Testing      Left Hip   Planes of Motion   Flexion: 5  Extension: 5  Abduction: 5        Right Hip   Planes of Motion   Flexion: 5  Extension: 5  Abduction: 5        Muscle Activation     Additional Muscle Activation Details  Fair diaphragmatic excursion with relaxed breathing   Good TA activation upon attempt     See Exercise, Manual, and Modality Logs for complete treatment.      Pt Ed: Pt was instructed in results of today's reassessmentupdated HEP (doorway/corner pec stretch and horiz sh abd), reviewed pregnancy precautions, activity and exercise guidelines and monitoring for tolerance by RPE, expected and potential response to Rx, options for bracing, and review of plan of care, goal status, and upcoming goals. Pt expressed understanding of instruction given.     .    Assessment/Plan  Extremely positive response to PT intervention with decreased pain, improved activity tolernace, and improved motor control; needs to continue skilled PT to address pelvic positional faults and pain limiting activity tolerance with plan to be reassessed due to evolving changing condition (pregnancy) affecting musculoskeletal structures and potentially impacting progress. See goal status below. Goals and plan were discussed with the patient with the patient expressing understanding/agreement  Plan:    Visit Diagnoses:    ICD-10-CM ICD-9-CM   1. Sacroiliitis (CMS/[facility])  M46.1 720.2   2. Pelvic pain  R10.2 [id number]   3. Lack of coordination  R27.9 781.3       Progress toward previous goals: Partially Met    Goals  Goals (to be achieved in 30 days)  1) Outcome measure (Female NIH-CPSI score) score  <12 /44 to represent overall improved function 6/44 as of 5/26/21 MET  2) Independent in short term home exercise program and activity modification to improve symptoms and pelvic stability MET    Long Term Goals (to be achieved within 90 days)   1) Outcome measure Female NIH-CPSI score) score  < 8/44 to represent overall improved function- 6/44 as of 5/26/21 MET  2) > good lumbopelvic muscle coordination to represent improved function (sitting, self care)- progressing  3) Patient to report >40% overall improvement in symptoms- MET 80% as of 5/26/21; needs monitoring for consistency and due to evolving condition (pregnancy)    4) Patient independent in long term home exercise program and ADL modifications - needs additional time to meet goal       Recommendations: Continue as planned  Timeframe: 1 month  Prognosis to achieve goals: good    PT Signature: RADHA Vega, PT , DPT, WCS      Based upon review of the patient's progress and continued therapy plan, it is my medical opinion that Tosha Thurston should continue physical therapy treatment at [facility]  [address],   [facility] [address]  [phone].    Signature: __________________________________      Timed:  Manual Therapy:   16      mins  92800;  Therapeutic Exercise:    8     mins  21712;     Neuromuscular Anaya:   18    mins  44155;    Therapeutic Activity:          mins  34250;     Gait Training:           mins  06234;     Ultrasound:          mins  80170;    Electrical Stimulation:         mins  77334 ( );  Ultrasound:          mins  57645;    Ionto                                   mins   30123  Self Care                            mins   79452    Untimed:  Electrical Stimulation:         mins  76516 ( );  Mechanical Traction:         mins  07851;   Dry Needling          mins self-pay  Low Eval          Mins  45584  Mod Eval          Mins  17911  High Eval                            Mins  12867  Re-Eval                               mins  36688    Timed Treatment:  42    mins   Total Treatment:    42    mins

## 2021-06-09 ENCOUNTER — TREATMENT (OUTPATIENT)
Dept: PHYSICAL THERAPY | Facility: CLINIC | Age: 31
End: 2021-06-09

## 2021-06-09 DIAGNOSIS — R10.2 PELVIC PAIN: ICD-10-CM

## 2021-06-09 DIAGNOSIS — M46.1 SACROILIITIS (HCC): Primary | ICD-10-CM

## 2021-06-09 DIAGNOSIS — R27.9 LACK OF COORDINATION: ICD-10-CM

## 2021-06-09 PROCEDURE — 97110 THERAPEUTIC EXERCISES: CPT | Performed by: PHYSICAL THERAPIST

## 2021-06-09 PROCEDURE — 97112 NEUROMUSCULAR REEDUCATION: CPT | Performed by: PHYSICAL THERAPIST

## 2021-06-09 PROCEDURE — 97140 MANUAL THERAPY 1/> REGIONS: CPT | Performed by: PHYSICAL THERAPIST

## 2021-06-09 PROCEDURE — 97530 THERAPEUTIC ACTIVITIES: CPT | Performed by: PHYSICAL THERAPIST

## 2021-06-09 NOTE — PROGRESS NOTES
PHYSICAL THERAPY DAILY PROGRESS NOTE    Patient: Tosha Thurston   : 1990  Diagnosis/ICD-10 Code:  Sacroiliitis (CMS/HCC) [M46.1]  Referring practitioner: Self Referring  Date of Initial Visit: Type: THERAPY  Noted: 3/3/2021  Today's Date: 2021  Patient seen for 13 sessions           Subjective To repeat glucose tolerance test, saw nurse midwife and no new news otherwise; to see MFM to monitor growth (due to umbilical position), 26 weeks. Pain with intercourse continues to be better. Agreeable to proposed plan of care (one more Rx).        Objective TIBURCIO: 21, L posterior R anterior innominate, positive pubic shotgun; ttp L glute med     See Exercise, Manual, and Modality Logs for complete treatment.     Patient was educated in updated HEP (added seated hip adduction) with handouts/supplies provided, expected and potential response to Rx, safe options for perineal massage (patient states was OKd by CNM midwife, PT goals and plan were reviewed with the patient expressing understanding/agreement. Instructed in/reviewed proper lumbopelvic position and preemptive brace with work activities and ADLs.    Assessment/Plan Continues to demonstrate/report improvement; tolerant to treatment without increase in pain and with good response  noted. Needs to continue a brief duration of skilled PT (one more visit) to complete HEP instruction and achieve all goals.  Plan: Review today's instruction and pelvic brace progression next Rx; anticipate discharge due to current status/improvement.     Visit Diagnoses:    ICD-10-CM ICD-9-CM   1. Sacroiliitis (CMS/HCC)  M46.1 720.2   2. Pelvic pain  R10.2 [id number]   3. Lack of coordination  R27.9 781.3       Timed:  Manual Therapy:    12     mins  94839;  Therapeutic Exercise:     8    mins  23081;     Neuromuscular Anaya:   12     mins  34727;    Therapeutic Activity:     10     mins  32512;     Gait Training:           mins   61060;     Ultrasound:          mins  88112;    Electrical Stimulation:         mins  92064 ( );    Untimed:  Electrical Stimulation:         mins  37553 ( );  Mechanical Traction:         mins  33529;   Dry Needling          mins self-pay  Traction          mins 37564  Low Eval          Mins  38236  Mod Eval          Mins  22559  High Eval                            Mins  49817  Re-Eval                               mins  40429    Timed Treatment:  42    mins   Total Treatment:    42    mins    PT SIGNATURE: RADHA Vega PT, DPT, WCS   DATE: 6/9/2021

## 2021-06-09 NOTE — PATIENT INSTRUCTIONS
Access Code: [email or web address]  URL: [email or web address]  Date: 06/09/2021  Prepared by: Radha Vega    Exercises  Seated Hip Adduction Isometrics with Ball - 3 x daily - 7 x weekly - 1 sets - 5 reps - 5 hold

## 2021-06-16 ENCOUNTER — TREATMENT (OUTPATIENT)
Dept: PHYSICAL THERAPY | Facility: CLINIC | Age: 31
End: 2021-06-16

## 2021-06-16 DIAGNOSIS — R27.9 LACK OF COORDINATION: ICD-10-CM

## 2021-06-16 DIAGNOSIS — R10.2 PELVIC PAIN: ICD-10-CM

## 2021-06-16 DIAGNOSIS — M46.1 SACROILIITIS (HCC): Primary | ICD-10-CM

## 2021-06-16 PROCEDURE — 97530 THERAPEUTIC ACTIVITIES: CPT | Performed by: PHYSICAL THERAPIST

## 2021-06-16 PROCEDURE — 97140 MANUAL THERAPY 1/> REGIONS: CPT | Performed by: PHYSICAL THERAPIST

## 2021-06-16 PROCEDURE — 97110 THERAPEUTIC EXERCISES: CPT | Performed by: PHYSICAL THERAPIST

## 2021-06-16 PROCEDURE — 97112 NEUROMUSCULAR REEDUCATION: CPT | Performed by: PHYSICAL THERAPIST

## 2021-06-16 NOTE — PROGRESS NOTES
PHYSICAL THERAPY DAILY PROGRESS NOTE    Patient: Tosha Thurston   : 1990  Diagnosis/ICD-10 Code:  Sacroiliitis (CMS/HCC) [M46.1]  Referring practitioner: Self Referring  Date of Initial Visit: Type: THERAPY  Noted: 3/3/2021  Today's Date: 2021  Patient seen for 14 sessions             Subjective Was able to go hiking for 5 miles, no pain during, mild pain in posterior left hip, 5-6/10  but was able to do stretches and it helped with the pain, otherwise has not had pain and feels goals have been met.  No change in medical status/activity status, has not seen CNM since prior PT. Agreeable to discharge from PT today.    Objective 27 weeks gestation; TIBURCIO 21.    See Exercise, Manual, and Modality Logs for complete treatment.     Patient was educated in updated HEP with handouts/supplies provided, expected and potential response to Rx, and goals and plan were reviewed with the patient expressing understanding/agreement. Instructed in/reviewed proper lumbopelvic with work activities and ADLs; including postural awareness/reminders and exercises to address and including recommendations for pregnancy safe exercise activities and precautions..      Assessment/Plan Today's treatment focused on ensuring HEP proficience with patient tolerant to treatment without increase in pain and with good proficience with HEP noted. Appropriate to discharge PT due to all goals met; patient agreeable to plan to discharge.  Plan: Discharge PT due to all goals met, DC summary to follow.    Visit Diagnoses:    ICD-10-CM ICD-9-CM   1. Sacroiliitis (CMS/HCC)  M46.1 720.2   2. Pelvic pain  R10.2 [id number]   3. Lack of coordination  R27.9 781.3       Timed:  Manual Therapy:   12     mins  93272;  Therapeutic Exercise:    8     mins  67582;     Neuromuscular Anaya:   15     mins  17854;    Therapeutic Activity:     10     mins  66095;     Gait Training:           mins  13894;      Ultrasound:          mins  99187;    Electrical Stimulation:         mins  50377 ( );    Untimed:  Electrical Stimulation:         mins  91609 ( );  Mechanical Traction:         mins  74359;   Dry Needling          mins self-pay  Traction          mins 35163  Low Eval          Mins  52033  Mod Eval          Mins  64397  High Eval                            Mins  59611  Re-Eval                               mins  03738    Timed Treatment:   45   mins   Total Treatment:  45      mins    PT SIGNATURE: RADHA Vega PT, DPT, WCS   DATE: 6/16/2021

## 2021-08-04 ENCOUNTER — DOCUMENTATION (OUTPATIENT)
Dept: PHYSICAL THERAPY | Facility: CLINIC | Age: 31
End: 2021-08-04

## 2021-08-04 NOTE — PROGRESS NOTES
Discharge Summary  Discharge Summary from Physical Therapy Report      Dates  PT visit: 3/3/21 - 6/16/21  Number of Visits: 14     Principal Treatments Provided: manual therapy, self care/pt ed, neuromuscular reeducation, therapeutic exercise, therapeutic activities       Goals: All Met    Prognosis goo, based on status at time of discharge and progression during course of interventions.     Comments/ Objective Status: See note from most recent treatment 6/16/21 for status upon discharge. All goals were achieved. PT and patient discussed indications for PT after delivery, patient expressed understanding/agreement.     Reason for plan status: Patient achieved goals          Plan of care: Continue with current home exercise program as instructed, future need for rehabilitation interventions was discussed prior to discharge.      Date of Discharge: 8/4/21        RADHA Vega, PT, DPT, WCS  Physical Therapist

## 2021-10-27 ENCOUNTER — TREATMENT (OUTPATIENT)
Dept: PHYSICAL THERAPY | Facility: CLINIC | Age: 31
End: 2021-10-27

## 2021-10-27 DIAGNOSIS — N81.89 PELVIC FLOOR WEAKNESS: ICD-10-CM

## 2021-10-27 DIAGNOSIS — R10.2 PAIN OF PELVIC GIRDLE: ICD-10-CM

## 2021-10-27 DIAGNOSIS — R10.2 PELVIC AND PERINEAL PAIN: ICD-10-CM

## 2021-10-27 DIAGNOSIS — N39.3 SUI (STRESS URINARY INCONTINENCE, FEMALE): Primary | ICD-10-CM

## 2021-10-27 PROCEDURE — 97112 NEUROMUSCULAR REEDUCATION: CPT | Performed by: PHYSICAL THERAPIST

## 2021-10-27 PROCEDURE — 97162 PT EVAL MOD COMPLEX 30 MIN: CPT | Performed by: PHYSICAL THERAPIST

## 2021-10-27 PROCEDURE — 97530 THERAPEUTIC ACTIVITIES: CPT | Performed by: PHYSICAL THERAPIST

## 2021-10-27 NOTE — PROGRESS NOTES
Patient: Tosha Thurston   : 1990  Diagnosis/ICD-10 Code:  SAADIA (stress urinary incontinence, female) [N39.3]  Referring practitioner: Self Referring  Date of Initial Visit: Type: THERAPY  Noted: 10/27/2021  Today's Date: 10/27/2021  Patient seen for 1 sessions           Subjective Questionnaire: Female NIH-CPSI       Subjective Evaluation    History of Present Illness  Mechanism of injury: Patient is a 31 year-old reporting a 7 week history of pelvic pain and leaking of urine, pain is aggravated by sitting, after lying in recumbent position, and by lifting and carrying of  child, 3/10,  relieved by repositioning in sitting and lying and by heat but with symptoms continuing. No report re: pain with intercourse as has not yet resumed following recent childbirth and delivery. Also reports leaking of urine with sneezing (a few drops in amount). Saw midwife for 6 week follow up and pelvic exam and cleared for all activity. Patient's goal for therapy is to not have pain and not leak urine.      Medical history reviewed with patient and includes congenital cardiac (aortic valve) condition (at recent  cardiology f/u no activity limitations per pt), recent pregnancy and delivery (21, reports labor < 2 hours); additional medical history in medical record. Treated at this facility during pregnancy for pregnancy-related pain. Medication list in chart. Declined internal pelvic floor exam, agreeable at next visit.   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   Activity level: hiking, yoga- would like to return to prior activity recreational exercise level but limited by pain      Precautions and Work Restrictions: 21 delivery of infant; cleared for intercoursePatient Goals  Patient goals for therapy: return to sport/leisure activities and decreased pain  Patient goal: sit without discomfort, not have pain with  intercourse            Objective          Postural Observations  Seated posture: poor (Shifts to R ich tub in sitting; frequent repositioning due to pain (perineal))        Palpation   Left   Hypertonic in the gluteus medius, iliopsoas and piriformis.   Tenderness of the gluteus medius, iliopsoas and piriformis.     Active Range of Motion     Lumbar   Normal active range of motion    Additional Active Range of Motion Details  Except L lateral flexion decreased by 50% due to pelvic/perineal discomfort     Strength/Myotome Testing     Left Hip   Planes of Motion   Flexion: 4  Abduction: 4+  Adduction: 4+    Right Hip   Planes of Motion   Flexion: 5  Abduction: 4+  Adduction: 4+    Additional Strength Details  Pelvic pain reproduced with resisted left hip flexion    Muscle Activation     Additional Muscle Activation Details  Fair TA engagement  Consented to xternal pelvic floor assessment, external palpation reveals generates pelvic floor contraction but utilized gluteal activation and pelvic tilt to generate (unable to isolate).     Tests     Left Hip   Positive Gillet's.     Additional Tests Details  ASLR L   Positive Henrique test L > R  Negative pubic shotgun  Positive LEDY, PTTT            Assessment & Plan     Assessment  Assessment details: Assessment: Patient presents for evaluation and treatment of pelvic pain, pelvic floor weakness, and leaking of urine with activity; today assessment reveals special test findings, flexibility and activation deficits, and reported activity intolerance consistent with pelvic girdle positional faults and pelvic floor weakness/incoordination all of which impact her ability to tolerate activities of daily living and care of . She needs to attend skilled physical therapy to address these deficits and return her to prior level of function. Goals and plan were discussed with the patient with the patient expressing understanding and agreement with the proposed plan of care.         Patient was instructed in clinical findings of today's encounter, goals and plan of care, proper lumbopelvic positioning with ADLs including care of infant, short term HEP, ADL performance to improve symptoms;, and potential and expected response to Rx.  Patient expressed understanding of instruction given.          Goals  Plan Goals: Short Term Goals (to be achieved in 30 days)  1) Outcome measure (Female NIH-CPSI score) score  < 15/44 to represent overall improved function  2) Report >25% improvement in pain with sitting  3) Independent in short term home exercise program and activity modification to improve symptoms    Long Term Goals (to be achieved within 90 days)   1) Outcome measure Female NIH-CPSI score) score  < 12/44 to represent overall improved function  2) > good lumbopelvic muscle coordination to represent improved tolerance for sitting, care of infant, and sleep positions of sleep  3) Patient to report >75% overall improvement in pain and leaking of urine  4) Patient independent in long term home exercise program and ADL modifications     Plan  Therapy options: will be seen for skilled physical therapy services  Planned modality interventions: thermotherapy (hydrocollator packs) and cryotherapy  Planned therapy interventions: abdominal trunk stabilization, motor coordination training, neuromuscular re-education, soft tissue mobilization, manual therapy, body mechanics training, postural training, strengthening, spinal/joint mobilization, home exercise program, therapeutic activities, joint mobilization and stretching  Frequency: 1-2X per week.  Treatment plan discussed with: patient  Plan details: Assess internal pelvic floor in upcoming visits as consent indicates        Visit Diagnoses:    ICD-10-CM ICD-9-CM   1. SAADIA (stress urinary incontinence, female)  N39.3 625.6   2. Pelvic and perineal pain  R10.2 625.9   3. Pain of pelvic girdle  R10.2 [id number]   4. Lack of coordination  R27.9 781.3        Timed:  Manual Therapy:         mins  49975;  Therapeutic Exercise:         mins  24728;     Neuromuscular Anaya:  9      mins  41578;    Therapeutic Activity:    10      mins  23112;     Gait Training:           mins  21640;     Ultrasound:          mins  66184;    Electrical Stimulation:         mins  45614 ( );    Untimed:  Electrical Stimulation:         mins  71932 ( );  Mechanical Traction:         mins  66490;   Dry Needling          mins self-pay  Traction          mins 28478  Low Eval          Mins  10331  Mod Eval     39     Mins  93057  High Eval                            Mins  80048  Re-Eval                               mins  32201    Timed Treatment:   19   mins   Total Treatment:     58  mins    PT SIGNATURE: RADHA Vega, PT, DPT, WCS   DATE TREATMENT INITIATED: 10/27/2021    Initial Certification  Certification Period: 1/25/2022  I certify that the therapy services are furnished while this patient is under my care.  The services outlined above are required by this patient, and will be reviewed every 30 days.     PHYSICIAN: Referring, Self    DATE:     Please sign and return via fax to [phone].. Thank you, [facility].

## 2021-10-27 NOTE — PATIENT INSTRUCTIONS
Access Code: [email or web address]  URL: [email or web address]  Date: 10/27/2021  Prepared by: Radha Vega    Exercises  Supine Lower Trunk Rotation - 1 x daily - 7 x weekly - 1 sets - 2 reps - 20 hold  Seated Isometric Hip Extension - 4 x daily - 7 x weekly - 1 sets - 5 reps - 5 hold

## 2021-11-03 ENCOUNTER — TREATMENT (OUTPATIENT)
Dept: PHYSICAL THERAPY | Facility: CLINIC | Age: 31
End: 2021-11-03

## 2021-11-03 DIAGNOSIS — R10.2 PELVIC AND PERINEAL PAIN: Primary | ICD-10-CM

## 2021-11-03 DIAGNOSIS — N81.89 PELVIC FLOOR WEAKNESS: ICD-10-CM

## 2021-11-03 DIAGNOSIS — N39.3 SUI (STRESS URINARY INCONTINENCE, FEMALE): ICD-10-CM

## 2021-11-03 DIAGNOSIS — R10.2 PAIN OF PELVIC GIRDLE: ICD-10-CM

## 2021-11-03 PROCEDURE — 97140 MANUAL THERAPY 1/> REGIONS: CPT | Performed by: PHYSICAL THERAPIST

## 2021-11-03 PROCEDURE — 97112 NEUROMUSCULAR REEDUCATION: CPT | Performed by: PHYSICAL THERAPIST

## 2021-11-03 PROCEDURE — 97530 THERAPEUTIC ACTIVITIES: CPT | Performed by: PHYSICAL THERAPIST

## 2021-11-03 PROCEDURE — 97110 THERAPEUTIC EXERCISES: CPT | Performed by: PHYSICAL THERAPIST

## 2021-11-03 NOTE — PROGRESS NOTES
"                                                          PHYSICAL THERAPY DAILY PROGRESS NOTE    Patient: Tosha Thurston   : 1990  Diagnosis/ICD-10 Code:  Pelvic and perineal pain [R10.2]  Referring practitioner: Self Referring  Date of Initial Visit: Type: THERAPY  Noted: 10/27/2021  Today's Date: 11/3/2021  Patient seen for 2 sessions           Subjective States was cleared for intercourse at  visit, not on pelvic rest, consented to internal pelvic floor Rx. Negative sexual abuse history, and declined need for third party in room. No pain after Rx.       Objective: positive L Gillet, negative pubic shotgun, mild diastasis up to 1\" inferior to umbilicus.  Internal pelvic floor exam reveals: Visual inspection: utilizes gluteal contraction to generate pelvic floor contraction, minimal to no lift with contraction or descent with relaxation. External palpation: ttp R ischiocavernosis. Internal pelvic floor exam reveals: slight tissue restriction at introitis, multiple cues required to avoid breath holding and posterior pelvic tilt upon introduction of single digit, able to achieve relaxed pelvic position with use of diaphragmatic breathing. Hypertonic and tender L OI, B PC R > L, B ICC. Pelvic floor MMT- trace contraction and minimal descent upon relaxation when cued. Able to localize touch to external and internal pelvic floor. Negative vaginismus response.         See Exercise, Manual, and Modality Logs for complete treatment.     Patient was educated in updated HEP (added diaphragmatic breathing, and knee rocks with diaphragmatic breathing), handouts/supplies provided, expected and potential response to Rx, and goals and plan were reviewed with the patient expressing understanding/agreement. Instructed in/reviewed proper pelvic positioning/posture with work activities, ADLs, and self care.     Assessment/Plan Pelvic positional faults and pelvic and abdominal weakness incoordination appearing related to " recent pregnancy/delivery noted; today's treatment focused on addressing biomechanical deficits and assessing internal pelvic floor structures with patient tolerant to treatment without increase in pain and with ** noted. Needs to continue skilled PT to continue progress towards goals and to address symptoms limiting function.    Plan: Pelvic floor downtraining and continue to address positional faults next Rx.        Visit Diagnoses:    ICD-10-CM ICD-9-CM   1. Pelvic and perineal pain  R10.2 625.9   2. Pain of pelvic girdle  R10.2 [id number]   3. SAADIA (stress urinary incontinence, female)  N39.3 625.6   4. Pelvic floor weakness  N81.89 618.89       Timed:  Manual Therapy:   14     mins  22796;  Therapeutic Exercise:    9     mins  10091;     Neuromuscular Anaya:   18    mins  22259;    Therapeutic Activity:     9     mins  65450;     Gait Training:           mins  03610;     Ultrasound:          mins  50711;    Electrical Stimulation:         mins  04868 ( );    Untimed:  Electrical Stimulation:         mins  75516 ( );  Mechanical Traction:         mins  59593;   Dry Needling          mins self-pay  Traction          mins 19262  Low Eval          Mins  01721  Mod Eval          Mins  04025  High Eval                            Mins  33617  Re-Eval                               mins  67911    Timed Treatment:  50    mins   Total Treatment:    50    mins    PT SIGNATURE: RADHA Vega, PT, DPT, WCS   DATE: 11/3/2021

## 2021-11-17 ENCOUNTER — TREATMENT (OUTPATIENT)
Dept: PHYSICAL THERAPY | Facility: CLINIC | Age: 31
End: 2021-11-17

## 2021-11-17 DIAGNOSIS — N39.3 SUI (STRESS URINARY INCONTINENCE, FEMALE): ICD-10-CM

## 2021-11-17 DIAGNOSIS — N81.89 PELVIC FLOOR WEAKNESS: ICD-10-CM

## 2021-11-17 DIAGNOSIS — R10.2 PELVIC AND PERINEAL PAIN: Primary | ICD-10-CM

## 2021-11-17 DIAGNOSIS — R10.2 PAIN OF PELVIC GIRDLE: ICD-10-CM

## 2021-11-17 PROCEDURE — 97112 NEUROMUSCULAR REEDUCATION: CPT | Performed by: PHYSICAL THERAPIST

## 2021-11-17 PROCEDURE — 97140 MANUAL THERAPY 1/> REGIONS: CPT | Performed by: PHYSICAL THERAPIST

## 2021-11-17 PROCEDURE — 97110 THERAPEUTIC EXERCISES: CPT | Performed by: PHYSICAL THERAPIST

## 2021-11-17 NOTE — PROGRESS NOTES
PHYSICAL THERAPY DAILY PROGRESS NOTE    Patient: Tosha Thurston   : 1990  Diagnosis/ICD-10 Code:  Pelvic and perineal pain [R10.2]  Referring practitioner: Self Referring  Date of Initial Visit: Type: THERAPY  Noted: 10/27/2021  Today's Date: 2021  Patient seen for 3 sessions           Subjective States pain with sitting is improving, no new report re: intercourse pain. Agreeable to proposed plan of care and to potential internal pelvic floor next Rx (on menstrual cycle today).         Objective R ant, L post innom, ttp L glute    See Exercise, Manual, and Modality Logs for complete treatment.     Patient was educated in updated HEP (see flowsheets) with handouts/supplies provided, expected and potential response to Rx, and goals and plan were reviewed with the patient expressing understanding/agreement. Instructed in/reviewed pelvic floor relaxation with self care.     Assessment/Plan Today's treatment focused on addressing pelvic positional faults and pelvic floor coordination; improved pain level and activity tolerance noted. Needs to continue skilled PT to continue progress towards goals and to address symptoms limiting function.    Plan: Internal pelvic floor Rx, assess PF coordination next Rx.      Visit Diagnoses:    ICD-10-CM ICD-9-CM   1. Pelvic and perineal pain  R10.2 625.9   2. Pain of pelvic girdle  R10.2 [id number]   3. SAADIA (stress urinary incontinence, female)  N39.3 625.6   4. Pelvic floor weakness  N81.89 618.89       Timed:  Manual Therapy:    16     mins  42645;  Therapeutic Exercise:    10     mins  10000;     Neuromuscular Anaya:   14    mins  98344;    Therapeutic Activity:          mins  03226;     Gait Training:           mins  96018;     Ultrasound:          mins  26873;    Electrical Stimulation:         mins  91218 ( );    Untimed:  Electrical Stimulation:         mins  88087 ( );  Mechanical Traction:          mins  61723;   Dry Needling          mins self-pay  Traction          mins 94616  Low Eval          Mins  51317  Mod Eval          Mins  41119  High Eval                            Mins  62215  Re-Eval                               mins  92691    Timed Treatment:  40    mins   Total Treatment:    40    mins    PT SIGNATURE: RADHA Vega PT, DPT, WCS (electronically signed) [place] License #[id number]   DATE: 11/17/2021

## 2021-11-17 NOTE — PATIENT INSTRUCTIONS
Access Code: [email or web address]  URL: [email or web address]  Date: 11/17/2021  Prepared by: Radha Vega    Exercises  Supine Pelvic Floor Stretch - 1 x daily - 7 x weekly - 2 sets - 2 reps - 20 hold  Supine Lower Trunk Rotation - 1 x daily - 7 x weekly - 2 sets - 10 reps - 2 hold  Seated Diaphragmatic Breathing - 1 x daily - 7 x weekly - 2 sets - 6 reps - 5 hold  Half Kneeling Hip Flexor Stretch - 2 x daily - 7 x weekly - 2 sets - 2 reps - 20 hold

## 2021-11-24 ENCOUNTER — TREATMENT (OUTPATIENT)
Dept: PHYSICAL THERAPY | Facility: CLINIC | Age: 31
End: 2021-11-24

## 2021-11-24 DIAGNOSIS — R10.2 PELVIC AND PERINEAL PAIN: Primary | ICD-10-CM

## 2021-11-24 DIAGNOSIS — R10.2 PAIN OF PELVIC GIRDLE: ICD-10-CM

## 2021-11-24 DIAGNOSIS — N81.89 PELVIC FLOOR WEAKNESS: ICD-10-CM

## 2021-11-24 DIAGNOSIS — N39.3 SUI (STRESS URINARY INCONTINENCE, FEMALE): ICD-10-CM

## 2021-11-24 PROCEDURE — 97112 NEUROMUSCULAR REEDUCATION: CPT | Performed by: PHYSICAL THERAPIST

## 2021-11-24 PROCEDURE — 97140 MANUAL THERAPY 1/> REGIONS: CPT | Performed by: PHYSICAL THERAPIST

## 2021-11-24 PROCEDURE — 97110 THERAPEUTIC EXERCISES: CPT | Performed by: PHYSICAL THERAPIST

## 2021-11-24 NOTE — PROGRESS NOTES
PT Re-Assessment / Re-Certification/Plan of care      Patient: Tosha Thurston   : 1990  Diagnosis/ICD-10 Code:  Pelvic and perineal pain [R10.2]  Referring practitioner: Self Referring  Date of Initial Visit: Type: THERAPY  Noted: 10/27/2021  Today's Date: 2021  Patient seen for 4 sessions      Subjective:     Subjective Questionnaire: Female NIH-CPSI 1444  Clinical Progress: improved  Home Program Compliance: Yes  Treatment has included: therapeutic exercise, neuromuscular re-education, manual therapy and therapeutic activity self care/pt education    Subjective States pain is improved, can sit longer with less pain, less pain with care of baby but still some pain with lifting and transportation of infant, pain currently 50% improved since beginning PT. States leaking of urine still happening but less often, no new report re: intercourse pain.  Internal pelvic exam consent was obtained and patient declined need for second person in room.    Objective   Postural Observations  Seated posture:  sits with WBing evenly between ischial tubersosities           Palpation   Left   Hypertonic in the gluteus medius, iliopsoas and piriformis.   Tenderness of the gluteus medius, iliopsoas and piriformis.           Strength/Myotome Testing      Left Hip   Planes of Motion   Flexion: 4+  Abduction: 4+  Adduction: 4+     Right Hip   Planes of Motion   Flexion: 5  Abduction: 5  Adduction: 4+    Additional Strength Details  Pelvic pain reproduced with resisted left hip flexion     Muscle Activation     Additional Muscle Activation Details  Fair TA engagement  Consented to internal pelvic floor assessment,   Pelvic exam reveals  Visual inspection: utilizes gluteal contraction to generate pelvic floor contraction, slight lift with contraction and slight descent with relaxation.  Internal pelvic floor exam reveals: slight tissue restriction at introitis, multiple cues required to avoid breath holding and posterior pelvic  tilt upon introduction of single digit, able to achieve relaxed pelvic position with use of diaphragmatic breathing. Hypertonic and tender L OI, L PC L ICC. Pelvic floor MMT- 2/5 contraction and minimal descent upon relaxation when cued. Able to localize touch to external and internal pelvic floor. Negative vaginismus response.          See Exercise, Manual, and Modality Logs for complete treatment. Internal pelvic health consent obtained and declined need for second person in room or C/P to internal pelvic exam.     Assessment/Plan  Patient has attended 4  treatment sessions to address pelvic pain, pelvic floor weakness, and leaking of urine with activity; she demonstrates an extremely positive response as evidenced by reported decreased pain, improved motor control, improved pelvic positional faults, and improved activity tolerance.  She needs to continue PT to address pain limiting positioning and activity related to care of infant pain and leaking of urine which is still present and limiting function and to improve carryover, improve strength, endurance, and motor control to improve function. See goal status below, progressing toward goals but needs to continue PT to achieve goals. Goals and plan were discussed with the patient with the patient expressing understanding/agreement.  Patient was instructed in clinical findings of today's encounter, updated HEP (submax PF contraction and relaxation in sitting)goals and plan of care, potential and expected response to Rx, ideal positioning for care of infant, pain management techniques to address pain, Patient expressed understanding of instruction given.        Visit Diagnoses:    ICD-10-CM ICD-9-CM   1. Pelvic and perineal pain  R10.2 625.9   2. Pain of pelvic girdle  R10.2 [id number]   3. SAADIA (stress urinary incontinence, female)  N39.3 625.6   4. Pelvic floor weakness  N81.89 618.89       Progress toward previous goals: Partially Met    Goals  Goals: Short Term  Goals (to be achieved in 30 days)  1) Outcome measure (Female NIH-CPSI score) score  < 15/44 to represent overall improved function- 14/44 as of 11/24; MET  2) Report >25% improvement in pain with sitting MET  3) Independent in short term home exercise program and activity modification to improve symptoms MET    Long Term Goals (to be achieved within 90 days)   1) Outcome measure Female NIH-CPSI score) score  < 12/44 to represent overall improved function - 14/44 as of 11/24; MET  2) > good lumbopelvic muscle coordination to represent improved tolerance for sitting, care of infant, and sleep positions of sleep- progressing, needs additional time to meet goal  3) Patient to report >75% overall improvement in pain and leaking of urine - progressing  4) Patient independent in long term home exercise program and ADL modifications - progressing, needs continued progression       Recommendations: Continue as planned; continue internal pelvic floor Rx to address strength and coordination deficits  Timeframe: 1 month  Prognosis to achieve goals: good    PT Signature: RADHA Vega, PT , DPT, WCS (electronically signed) [place] License #[id number]      Based upon review of the patient's progress and continued therapy plan, it is my medical opinion that Tosha Thurston should continue physical therapy treatment at [facility] PHYSICAL THERAPY  [address] [place] [id number] [phone].  Certification Hirkab1111/24/2021  through 2/21/2022  Signature: __________________________________  Referring, Self    Timed:  Manual Therapy:    14     mins  35053;  Therapeutic Exercise:    10     mins  46928;     Neuromuscular Anaya:    20    mins  25068;    Therapeutic Activity:          mins  57182;     Gait Training:           mins  50375;     Ultrasound:          mins  85067;    Electrical Stimulation:         mins  75065 ( );  Ultrasound:          mins  36823;    Ionto                                    mins   53890  Self Care                            mins   60062    Untimed:  Electrical Stimulation:         mins  17495 ( );  Mechanical Traction:         mins  09410;   Dry Needling          mins self-pay  Low Eval          Mins  65228  Mod Eval          Mins  19062  High Eval                            Mins  67014  Re-Eval                               mins  19613    Timed Treatment:  44   mins   Total Treatment:     44   mins

## 2021-12-01 ENCOUNTER — TREATMENT (OUTPATIENT)
Dept: PHYSICAL THERAPY | Facility: CLINIC | Age: 31
End: 2021-12-01

## 2021-12-01 DIAGNOSIS — N39.3 SUI (STRESS URINARY INCONTINENCE, FEMALE): ICD-10-CM

## 2021-12-01 DIAGNOSIS — N81.89 PELVIC FLOOR WEAKNESS: ICD-10-CM

## 2021-12-01 DIAGNOSIS — R10.2 PELVIC AND PERINEAL PAIN: Primary | ICD-10-CM

## 2021-12-01 DIAGNOSIS — R10.2 PAIN OF PELVIC GIRDLE: ICD-10-CM

## 2021-12-01 PROCEDURE — 97112 NEUROMUSCULAR REEDUCATION: CPT | Performed by: PHYSICAL THERAPIST

## 2021-12-01 PROCEDURE — 97110 THERAPEUTIC EXERCISES: CPT | Performed by: PHYSICAL THERAPIST

## 2021-12-01 PROCEDURE — 97140 MANUAL THERAPY 1/> REGIONS: CPT | Performed by: PHYSICAL THERAPIST

## 2021-12-01 NOTE — PROGRESS NOTES
PHYSICAL THERAPY DAILY PROGRESS NOTE    Patient: Tosha Thurston   : 1990  Diagnosis/ICD-10 Code:  Pelvic and perineal pain [R10.2]  Referring practitioner: Self Referring  Date of Initial Visit: Type: THERAPY  Noted: 10/27/2021  Today's Date: 2021  Patient seen for 5 sessions           Subjective: No pain after prior Rx, Pain with sitting is better, still some discomfort when sitting on softer surfaces and for longer period os time and when having to lift infant more often than usual. No new report re: pain with intercourse; Internal pelvic exam consent was obtained and patient declined need for second person in room. No pain after Rx.        Objective Mild R ant innom, L post, negative pubic shotgun. Mild L perineal/groin pain pain with resisted hip abduction prior to manual PT, none after.   Able to relax/lengthen pelvic floor on initial attempt; L PC/BC mild hypertone and tenderness, tolerant to single digit insertion without tissue restriction/negative vaginismus response.          See Exercise, Manual, and Modality Logs for complete treatment.     Patient was educated in updated HEP (added adductor bias to kneeling IPS), option for vaginal dilators, PF lengthening/coordination prior to self care, expected and potential response to Rx, and goals and plan were reviewed with the patient expressing understanding/agreement.      Assessment/Plan Improved pelvic floor tone and mobility noted with patient tolerant to treatment without increase in pain and with improved motor control/ability to lengthen pelvic floor noted. Needs to continue skilled PT to continue progress towards goals and to address symptoms limiting function.    Plan: Continue internal pelvic floor PT to address tissue mobility; progress hip and to PF to strengthening as tolerated.       Visit Diagnoses:    ICD-10-CM ICD-9-CM   1. Pelvic and perineal pain  R10.2 625.9   2. Pain of  pelvic girdle  R10.2 [id number]   3. SAADIA (stress urinary incontinence, female)  N39.3 625.6   4. Pelvic floor weakness  N81.89 618.89       Timed:  Manual Therapy:    12     mins  32325;  Therapeutic Exercise:    12     mins  81242;     Neuromuscular Anaya:   18    mins  00452;    Therapeutic Activity:          mins  44588;     Gait Training:           mins  43287;     Ultrasound:          mins  45560;    Electrical Stimulation:         mins  66276 ( );    Untimed:  Electrical Stimulation:         mins  54176 ( );  Mechanical Traction:         mins  31065;   Dry Needling          mins self-pay  Traction          mins 30526  Low Eval          Mins  40901  Mod Eval          Mins  94685  High Eval                            Mins  48078  Re-Eval                               mins  06337    Timed Treatment:  42    mins   Total Treatment:    42    mins    PT SIGNATURE: RADHA Vega PT, DPT, WCS (electronically signed) [place] License #[id number]   DATE: 12/1/2021

## 2021-12-08 ENCOUNTER — TREATMENT (OUTPATIENT)
Dept: PHYSICAL THERAPY | Facility: CLINIC | Age: 31
End: 2021-12-08

## 2021-12-08 DIAGNOSIS — R10.2 PAIN OF PELVIC GIRDLE: ICD-10-CM

## 2021-12-08 DIAGNOSIS — N81.89 PELVIC FLOOR WEAKNESS: ICD-10-CM

## 2021-12-08 DIAGNOSIS — R10.2 PELVIC AND PERINEAL PAIN: Primary | ICD-10-CM

## 2021-12-08 DIAGNOSIS — N39.3 SUI (STRESS URINARY INCONTINENCE, FEMALE): ICD-10-CM

## 2021-12-08 PROCEDURE — 97112 NEUROMUSCULAR REEDUCATION: CPT | Performed by: PHYSICAL THERAPIST

## 2021-12-08 PROCEDURE — 97110 THERAPEUTIC EXERCISES: CPT | Performed by: PHYSICAL THERAPIST

## 2021-12-08 PROCEDURE — 97140 MANUAL THERAPY 1/> REGIONS: CPT | Performed by: PHYSICAL THERAPIST

## 2021-12-08 NOTE — PROGRESS NOTES
PHYSICAL THERAPY DAILY PROGRESS NOTE    Patient: Tosha Thurston   : 1990  Diagnosis/ICD-10 Code:  Pelvic and perineal pain [R10.2]  Referring practitioner: Self Referring  Date of Initial Visit: Type: THERAPY  Noted: 10/27/2021  Today's Date: 2021  Patient seen for 6 sessions           Subjective Did not have any pain after prior Rx, no pain after today's Rx, some pelvic ring pain with lifting of infant in car seat but overall improved. Agreeable to internal PF Rx, internal pelvic exam consent was obtained and patient declined need for second person in room.      Objective Slight tenderness and hypertone to B PC R > L, jump response with initial contact to external PF, no jump response, slight tissue restriction with internal PF contact. Trace contraction with attempted PF activation, substitution from gluteals (significant, able to correct after cueing from PT).       See Exercise, Manual, and Modality Logs for complete treatment.     Patient was educated in updated HEP (added banded bridge- see flowsheets) with handouts/supplies provided, expected and potential response to Rx, and goals and plan were reviewed with the patient expressing understanding/agreement. Instructed in/reviewed proper preemptive brace and proper lumbopelvic positioning with work activities, ADLs, and care of child.      Assessment/Plan Improved pelvic floor mobility, but continued motor control deficits with PF coordination noted;  Needs to continue skilled PT to continue progress towards goals and to address symptoms limiting function.      Plan: Continue internal PF manual PT and motor control/strengthening as tolerated        Visit Diagnoses:    ICD-10-CM ICD-9-CM   1. Pelvic and perineal pain  R10.2 625.9   2. Pain of pelvic girdle  R10.2 [id number]   3. SAADIA (stress urinary incontinence, female)  N39.3 625.6   4. Pelvic floor weakness  N81.89 618.89       Timed:  Manual  Therapy:    12     mins  99009;  Therapeutic Exercise:      10   mins  99647;     Neuromuscular Anaya:    19    mins  12413;    Therapeutic Activity:          mins  96648;     Gait Training:           mins  28988;     Ultrasound:          mins  46024;    Electrical Stimulation:         mins  37460 ( );    Untimed:  Electrical Stimulation:         mins  04121 ( );  Mechanical Traction:         mins  37639;   Dry Needling          mins self-pay  Traction          mins 50892  Low Eval          Mins  93909  Mod Eval          Mins  13266  High Eval                            Mins  56211  Re-Eval                               mins  25836    Timed Treatment:  41    mins   Total Treatment:    41    mins    PT SIGNATURE: RADHA Vega, PT, DPT, WCS   DATE: 12/8/2021

## 2021-12-14 ENCOUNTER — TELEPHONE (OUTPATIENT)
Dept: PHYSICAL THERAPY | Facility: CLINIC | Age: 31
End: 2021-12-14

## 2021-12-14 NOTE — TELEPHONE ENCOUNTER
SAYS SHE RECEIVED A CALL TO ASK IF HER APPT FOR TOMORROW, 12/15, COULD BE SWITCHED FROM 9 AM TO 9:15, SHE SAID THAT WAS OKAY. PLEASE GIVE HER A CALL AND GET THAT APPT TIME SWITCHED

## 2021-12-15 ENCOUNTER — TREATMENT (OUTPATIENT)
Dept: PHYSICAL THERAPY | Facility: CLINIC | Age: 31
End: 2021-12-15

## 2021-12-15 DIAGNOSIS — R10.2 PELVIC AND PERINEAL PAIN: Primary | ICD-10-CM

## 2021-12-15 DIAGNOSIS — N39.3 SUI (STRESS URINARY INCONTINENCE, FEMALE): ICD-10-CM

## 2021-12-15 DIAGNOSIS — N81.89 PELVIC FLOOR WEAKNESS: ICD-10-CM

## 2021-12-15 DIAGNOSIS — R10.2 PAIN OF PELVIC GIRDLE: ICD-10-CM

## 2021-12-15 PROCEDURE — 97110 THERAPEUTIC EXERCISES: CPT | Performed by: PHYSICAL THERAPIST

## 2021-12-15 PROCEDURE — 97112 NEUROMUSCULAR REEDUCATION: CPT | Performed by: PHYSICAL THERAPIST

## 2021-12-15 PROCEDURE — 97140 MANUAL THERAPY 1/> REGIONS: CPT | Performed by: PHYSICAL THERAPIST

## 2021-12-15 NOTE — PATIENT INSTRUCTIONS
Access Code: [email or web address]  URL: [email or web address]  Date: 12/15/2021  Prepared by: Radha Vega    Program Notes  With ADLs and work activities- focus on pelvic position as discussed  Perform as below, then with green resistance band, pressing out as you tilt        Exercises  Supine Posterior Pelvic Tilt - 1 x daily - 7 x weekly - 1 sets - 10 reps - 5 hold

## 2021-12-15 NOTE — PROGRESS NOTES
PHYSICAL THERAPY DAILY PROGRESS NOTE    Patient: Tosha Thurston   : 1990  Diagnosis/ICD-10 Code:  Pelvic and perineal pain [R10.2]  Referring practitioner: Self Referring  Date of Initial Visit: Type: THERAPY  Noted: 10/27/2021  Today's Date: 12/15/2021  Patient seen for 7 sessions           Subjective No new report re: pain with intercourse,has had some perineal pain with sitting and with care of infant, 3/10, feels may be related to menstrual cycle and is less severe than prior to coming to PT;   states at times can feel pelvic floor contraction (50-75% of the time) and feels perception of pelvic floor foreign has improved since beginning PT. Declined internal PF on menstrual cycle. No increase in pain during or after Rx.      Objective L posterior, R anterior innominate. R LEDY positove, neg pubic shotgun. Palpable contraction and relaxation of pelvic floor, initially untilizes gluteal contraction and pelvic tilt to activiate pelvic floor (coorected after cueing); assumes ant pelvic tilt at rest (corrects with cueing).     See Exercise, Manual, and Modality Logs for complete treatment.     Patient was educated in updated HEP (added hooklying submax PB and PPT with resisted hip abd) with handouts/supplies provided, expected and potential response to Rx, and goals and plan were reviewed with the patient expressing understanding/agreement. Instructed in/reviewed proper lumbopelvic positioning /posture (anteror tilt avoidance) with work activities and ADLs.      Assessment/Plan Improve motor control but needs to continue PT to address lumbopelvic coordination deficits and weakness;  today's treatment focused on progression of exercises to address this with patient tolerant to treatment and with improved motor control overall and post vs pre rx noted.    Plan: Internal PF and progress submax PB activation.     Visit Diagnoses:    ICD-10-CM ICD-9-CM    1. Pelvic and perineal pain  R10.2 625.9   2. Pain of pelvic girdle  R10.2 [id number]   3. SAADIA (stress urinary incontinence, female)  N39.3 625.6   4. Pelvic floor weakness  N81.89 618.89       Timed:  Manual Therapy:    10     mins  04647;  Therapeutic Exercise:    10     mins  35174;     Neuromuscular Anaya:   22    mins  36481;    Therapeutic Activity:          mins  64765;     Gait Training:           mins  23591;     Ultrasound:          mins  21043;    Electrical Stimulation:         mins  70570 ( );    Untimed:  Electrical Stimulation:         mins  68260 ( );  Mechanical Traction:         mins  73101;   Dry Needling          mins self-pay  Traction          mins 70805  Low Eval          Mins  28238  Mod Eval          Mins  99374  High Eval                            Mins  64142  Re-Eval                               mins  49704    Timed Treatment:  42    mins   Total Treatment:    42    mins    PT SIGNATURE: RADHA Vega, PT, DPT, WCS   DATE: 12/15/2021

## 2022-01-12 ENCOUNTER — TREATMENT (OUTPATIENT)
Dept: PHYSICAL THERAPY | Facility: CLINIC | Age: 32
End: 2022-01-12

## 2022-01-12 DIAGNOSIS — N39.3 SUI (STRESS URINARY INCONTINENCE, FEMALE): ICD-10-CM

## 2022-01-12 DIAGNOSIS — R10.2 PELVIC AND PERINEAL PAIN: Primary | ICD-10-CM

## 2022-01-12 DIAGNOSIS — N81.89 PELVIC FLOOR WEAKNESS: ICD-10-CM

## 2022-01-12 DIAGNOSIS — R10.2 PAIN OF PELVIC GIRDLE: ICD-10-CM

## 2022-01-12 PROCEDURE — 97140 MANUAL THERAPY 1/> REGIONS: CPT | Performed by: PHYSICAL THERAPIST

## 2022-01-12 PROCEDURE — 97110 THERAPEUTIC EXERCISES: CPT | Performed by: PHYSICAL THERAPIST

## 2022-01-12 PROCEDURE — 97112 NEUROMUSCULAR REEDUCATION: CPT | Performed by: PHYSICAL THERAPIST

## 2022-01-12 NOTE — PROGRESS NOTES
PT Re-Assessment / Re-Certification        Patient: Tosha Thurston   : 1990  Diagnosis/ICD-10 Code:  Pelvic and perineal pain [R10.2]  Referring practitioner: Self Referring  Date of Initial Visit: Type: THERAPY  Noted: 10/27/2021  Today's Date: 2022  Patient seen for 8 sessions      Subjective:     Clinical Progress: improved  Home Program Compliance: Yes  Treatment has included: therapeutic exercise, neuromuscular re-education, manual therapy and therapeutic activity self care/pt education    Subjective States had not been able to attend PT due to URI illness over past few weeks (now resolved),  pain is overall 50% improved, can sit longer with less pain, less pain with care of baby but still some pain with lifting and transportation of infant, pain currently 50% improved since beginning PT. No new report re: intercourse pain. Internal pelvic exam consent was obtained and patient declined need for second person in room  Objective   Postural Observations  Seated posture:  sits with WBing evenly between ischial tubersosities           Palpation   Left posterior, right anterior innom.  Positive L Gillet prior to, negative after Rx.      Hypertonic in the gluteus medius, iliopsoas and piriformis.   Tenderness of the gluteus medius, iliopsoas and piriformis.            Strength/Myotome Testing      Left Hip   Planes of Motion   Flexion: 5  Abduction: 4+  Adduction: 4+     Right Hip   Planes of Motion   Flexion: 5  Abduction: 5  Adduction: 4+      Muscle Activation     Additional Muscle Activation Details  Fair TA engagement  Consented to internal pelvic floor assessment,   Pelvic exam reveals  Visual inspection: Able to isolate pelvic floor contraction.  slight lift with contraction and slight descent with relaxation.  Internal pelvic floor exam reveals: slight tissue restriction at introitis, slight verbal cues required to avoid breath holding and posterior pelvic tilt upon introduction of single digit, able  to achieve relaxed pelvic position with use of diaphragmatic breathing. Hypertonic and tender B OI, L PC L ICC. Pelvic floor MMT- 2/5 contraction, able to relax on command and with timeliness after contraction. Able to localize touch to external and internal pelvic floor. Negative vaginismus response.          See Exercise, Manual, and Modality Logs for complete treatment. Internal pelvic exam consent obtained and declined need for second person in room or C/P to internal pelvic exam.       Assessment/Plan  Patient has attended 8  treatment sessions to address pelvic pain, pelvic floor weakness, and leaking of urine with activity; decreased pain, improved motor control and and improved activity tolerance noted although progress this assessment period potentially impacted from unable to attend PT per POC due to illness.  She needs to continue PT to address pain limiting function and self care. See goal status below, progressing toward goals but needs to continue PT to achieve goals. Goals and plan were discussed with the patient with the patient expressing understanding/agreement.  Patient was instructed in clinical findings of today's encounter/reassessment, updated HEP (submax PF contraction and relaxation in sitting with ball squeeze and resisted hip abd), goals and plan of care, and potential and expected response to Rx Patient expressed understanding of instruction given    Visit Diagnoses:    ICD-10-CM ICD-9-CM   1. Pelvic and perineal pain  R10.2 625.9   2. Pain of pelvic girdle  R10.2 [id number]   3. SAADIA (stress urinary incontinence, female)  N39.3 625.6   4. Pelvic floor weakness  N81.89 618.89       Progress toward previous goals: Partially Met    Goals  Short Term Goals (to be achieved in 30 days)  1) Outcome measure (Female NIH-CPSI score) score  < 15/44 to represent overall improved function- 14/44 as of 11/24; MET  2) Report >25% improvement in pain with sitting MET  3) Independent in short term home  exercise program and activity modification to improve symptoms MET    Long Term Goals (to be achieved within 90 days)   1) Outcome measure Female NIH-CPSI score) score  < 12/44 to represent overall improved function - 14/44 as of 11/24; MET  2) > good lumbopelvic muscle coordination to represent improved tolerance for sitting, care of infant, and sleep positions of sleep- progressing, needs additional time to meet goal  3) Patient to report >75% overall improvement in pain and leaking of urine - progressing  4) Patient independent in long term home exercise program and ADL modifications - progressing, needs continued progression      Recommendations: Continue as planned; continue internal PF and progression of pelvic stab, progress to standing and dynamic standing   Timeframe: 1 month  Prognosis to achieve goals: good    PT Signature: RADHA Vega PT , DPT, WCS (electronically signed) [place] License #[id number]    Certification Period1/12/2022  through 4/11/2022      Based upon review of the patient's progress and continued therapy plan, it is my medical opinion that Tosha Thurston should continue physical therapy treatment at [facility]  [address] [place] [id number] [phone].    Signature: __________________________________  Referring, Self    Timed:  Manual Therapy:    15     mins  28693;  Therapeutic Exercise:    10     mins  49296;     Neuromuscular Anaya:   20    mins  97252;    Therapeutic Activity:          mins  32521;     Gait Training:           mins  22348;     Ultrasound:          mins  50518;    Electrical Stimulation:         mins  82318 ( );  Ultrasound:          mins  32172;    Ionto                                   mins   87318  Self Care                            mins   27360    Untimed:  Electrical Stimulation:         mins  85629 ( );  Mechanical Traction:         mins  07341;   Dry Needling          mins self-pay  Low  Eval          Mins  70854  Mod Eval          Mins  72651  High Eval                            Mins  05979  Re-Eval                               mins  69814    Timed Treatment:  45    mins   Total Treatment:    45    mins

## 2022-01-19 ENCOUNTER — TREATMENT (OUTPATIENT)
Dept: PHYSICAL THERAPY | Facility: CLINIC | Age: 32
End: 2022-01-19

## 2022-01-19 DIAGNOSIS — N39.3 SUI (STRESS URINARY INCONTINENCE, FEMALE): ICD-10-CM

## 2022-01-19 DIAGNOSIS — N81.89 PELVIC FLOOR WEAKNESS: ICD-10-CM

## 2022-01-19 DIAGNOSIS — R10.2 PAIN OF PELVIC GIRDLE: ICD-10-CM

## 2022-01-19 DIAGNOSIS — R10.2 PELVIC AND PERINEAL PAIN: Primary | ICD-10-CM

## 2022-01-19 PROCEDURE — 97112 NEUROMUSCULAR REEDUCATION: CPT | Performed by: PHYSICAL THERAPIST

## 2022-01-19 PROCEDURE — 97140 MANUAL THERAPY 1/> REGIONS: CPT | Performed by: PHYSICAL THERAPIST

## 2022-01-19 PROCEDURE — 97110 THERAPEUTIC EXERCISES: CPT | Performed by: PHYSICAL THERAPIST

## 2022-01-19 NOTE — PROGRESS NOTES
PHYSICAL THERAPY DAILY PROGRESS NOTE    Patient: Tosha Thurston   : 1990  Diagnosis/ICD-10 Code:  Pelvic and perineal pain [R10.2]  Referring practitioner: Self Referring  Date of Initial Visit: Type: THERAPY  Noted: 10/27/2021  Today's Date: 2022  Patient seen for 9 sessions           Subjective Feels pain in posterior left is improved, can sit longer with less pain, due to scheduling has not been as consistent with exercise as in past weeks, expressed understanding of instruction given, Internal pelvic exam consent was obtained and patient declined need for second person in room. Improved L hip (indicates L PSIS) pain after Rx, no perineal pain after Rx.         Objective  B OI, L PC L ICC. able to contract and relax on command, initially glute substitution, corrects with verbal cueing. Improved isolation is standing vs hooklying.      See Exercise, Manual, and Modality Logs for complete treatment.     Patient was educated in updated HEP (added deep squat w lat WS, standing PB2x)  with handouts/supplies provided, expected and potential response to Rx, and goals and plan were reviewed with the patient expressing understanding/agreement. Instructed in/reviewed proper standing/sitting lumbopelvic position with work activities, ADLs, and care of child.      Assessment/Plan  Improved pain and motor control noted, tolerant to treatment without increase in pain  Needs to continue skilled PT to continue progress towards goals and to address symptoms limiting function.  Plan: Progress with standing PF strengthening, progress as tolerated, reassess squat as instructed today.     Visit Diagnoses:    ICD-10-CM ICD-9-CM   1. Pelvic and perineal pain  R10.2 625.9   2. Pain of pelvic girdle  R10.2 [id number]   3. SAADIA (stress urinary incontinence, female)  N39.3 625.6   4. Pelvic floor weakness  N81.89 618.89       Timed:  Manual Therapy:    14     mins   67193;  Therapeutic Exercise:   10     mins  37437;     Neuromuscular Anaya:   19    mins  51812;    Therapeutic Activity:          mins  43484;     Gait Training:           mins  62780;     Ultrasound:          mins  30130;    Electrical Stimulation:         mins  20433 ( );    Untimed:  Electrical Stimulation:         mins  32778 ( );  Mechanical Traction:         mins  89898;   Dry Needling          mins self-pay  Traction          mins 33373  Low Eval          Mins  50771  Mod Eval          Mins  75173  High Eval                            Mins  25099  Re-Eval                               mins  93058    Timed Treatment:  43    mins   Total Treatment:     43   mins    PT SIGNATURE: RADHA Vega PT, DPT, WCS (electronically signed) [place] License #[id number]  DATE: 1/19/2022

## 2022-01-26 ENCOUNTER — TREATMENT (OUTPATIENT)
Dept: PHYSICAL THERAPY | Facility: CLINIC | Age: 32
End: 2022-01-26

## 2022-01-26 DIAGNOSIS — N81.89 PELVIC FLOOR WEAKNESS: ICD-10-CM

## 2022-01-26 DIAGNOSIS — R10.2 PAIN OF PELVIC GIRDLE: ICD-10-CM

## 2022-01-26 DIAGNOSIS — R10.2 PELVIC AND PERINEAL PAIN: Primary | ICD-10-CM

## 2022-01-26 DIAGNOSIS — N39.3 SUI (STRESS URINARY INCONTINENCE, FEMALE): ICD-10-CM

## 2022-01-26 PROCEDURE — 97140 MANUAL THERAPY 1/> REGIONS: CPT | Performed by: PHYSICAL THERAPIST

## 2022-01-26 PROCEDURE — 97112 NEUROMUSCULAR REEDUCATION: CPT | Performed by: PHYSICAL THERAPIST

## 2022-01-26 PROCEDURE — 97110 THERAPEUTIC EXERCISES: CPT | Performed by: PHYSICAL THERAPIST

## 2022-01-26 NOTE — PROGRESS NOTES
PHYSICAL THERAPY DAILY PROGRESS NOTE    Patient: Tosha Thurston   : 1990  Diagnosis/ICD-10 Code:  Pelvic and perineal pain [R10.2]  Referring practitioner: No ref. provider found  Date of Initial Visit: Type: THERAPY  Noted: 10/27/2021  Today's Date: 2022  Patient seen for 10 sessions           Subjective  Feels pain in posterior left hip is improved, can sit with less pain, expressed understanding of instruction given. Internal pelvic exam consent was obtained and patient declined need for second person in room. Pain 2/10 with internal PF RX; no pain after Rx.       Objective Innominates appear aligned, negative pubic shotgun, no pain with resisted hip abduction.     See Exercise, Manual, and Modality Logs for complete treatment.     Patient was educated in updated HEP (progressed standing PB) with handouts/supplies provided, expected and potential response to Rx, and goals and plan were reviewed with the patient expressing understanding/agreement. Reviewed indications for vaginal dilators, pt expressed understanding/agreement.       Assessment/Plan Today's treatment focused on  with patient tolerant to treatment without increase in pain and with ** noted. Needs to continue skilled PT to continue progress towards goals and to address symptoms limiting function.  Plan: Discuss indications for vaginal dilators at next Rx.       Visit Diagnoses:    ICD-10-CM ICD-9-CM   1. Pelvic and perineal pain  R10.2 625.9   2. Pain of pelvic girdle  R10.2 [id number]   3. SAADIA (stress urinary incontinence, female)  N39.3 625.6   4. Pelvic floor weakness  N81.89 618.89       Timed:  Manual Therapy:    15     mins  35220;  Therapeutic Exercise:    10     mins  03779;     Neuromuscular Anaya:   17    mins  52794;    Therapeutic Activity:          mins  20516;     Gait Training:           mins  26932;     Ultrasound:          mins  50174;    Electrical Stimulation:          mins  94641 ( );    Untimed:  Electrical Stimulation:         mins  24560 ( );  Mechanical Traction:         mins  46501;   Dry Needling          mins self-pay  Traction          mins 45068  Low Eval          Mins  30202  Mod Eval          Mins  09865  High Eval                            Mins  67606  Re-Eval                               mins  31513    Timed Treatment:  42    mins   Total Treatment:    42    mins    PT SIGNATURE: RADHA Vega PT, DPT, WCS (electronically signed) [place] License #[id number]  DATE: 1/26/2022

## 2022-02-02 ENCOUNTER — TREATMENT (OUTPATIENT)
Dept: PHYSICAL THERAPY | Facility: CLINIC | Age: 32
End: 2022-02-02

## 2022-02-02 DIAGNOSIS — R10.2 PAIN OF PELVIC GIRDLE: ICD-10-CM

## 2022-02-02 DIAGNOSIS — N81.89 PELVIC FLOOR WEAKNESS: ICD-10-CM

## 2022-02-02 DIAGNOSIS — N39.3 SUI (STRESS URINARY INCONTINENCE, FEMALE): ICD-10-CM

## 2022-02-02 DIAGNOSIS — R10.2 PELVIC AND PERINEAL PAIN: Primary | ICD-10-CM

## 2022-02-02 PROCEDURE — 97110 THERAPEUTIC EXERCISES: CPT | Performed by: PHYSICAL THERAPIST

## 2022-02-02 PROCEDURE — 97530 THERAPEUTIC ACTIVITIES: CPT | Performed by: PHYSICAL THERAPIST

## 2022-02-02 PROCEDURE — 97112 NEUROMUSCULAR REEDUCATION: CPT | Performed by: PHYSICAL THERAPIST

## 2022-02-02 NOTE — PROGRESS NOTES
"PT Re-Assessment / Re-Certification        Patient: Tosha Thurston   : 1990  Diagnosis/ICD-10 Code:  Pelvic and perineal pain [R10.2]  Referring practitioner: No ref. provider found  Date of Initial Visit: Type: THERAPY  Noted: 10/27/2021  Today's Date: 2022  Patient seen for 11 sessions      Subjective:     Clinical Progress: improved  Home Program Compliance: Yes  Treatment has included: therapeutic exercise, neuromuscular re-education, manual therapy and therapeutic activity self care/pt education    Subjective Pain is overall 50-60% improved, can sit and  lift and transport infant without pain, pain is mostly at end of day and especially after a \"busy\" day, feels she can do exercises as instructed to improve pain. No new report re: intercourse, tampon use use, or well women exam pain. Started menstrual cycle last night, declined internal pelvic floor Rx today but agreeable ay upcoming Rxs..   Objective   Seated posture: sits with WBing evenly between ischial tubersosities  In standing- anterior pelvic tilt (corrects with cueing)     Palpation   Innominates appear aligned      Hypertonic iliopsoas B, R > L         Muscle Activation     Additional Muscle Activation Details  External palpation reveals\"  Good TA and pelvic floor engagement in hooklying and with alternating LE extension,   In standing, able to generate pelvic floor contraction and maintain 7s, occassional gluteal substitution noted.   Special tests:   Positive ASLR L, LEDY L   Negative pubic shotgun:        See Exercise, Manual, and Modality Logs for complete treatment.       See Exercise, Manual, and Modality Logs for complete treatment. Internal pelvic health consent obtained and declined need for second person in room or C/P to internal pelvic exam.   Assessment/Plan  Positive response to PT as evidenced by improved tolerance for sitting and care of infant,improved pelvic floor mobility as required for self care, and improved lumbopelvic " motor control; she needs to continue skilled PT intervention to improve endurance and continue improvements in motor control so as to return to full function (work duties, care of infant) and self care without limitation. See goal status below, progressing toward goals but needs to continue PT to achieve goals. Goals and plan were discussed with the patient with the patient expressing understanding/agreement.    Patient was instructed in clinical findings of today's encounter, goals and plan of care, proper lumbopelvic positioning to improve/maximize lumbopelvic stability, indications for vaginal dilators (patient states preference to continue with current in-office manual Rx), and potential and expected response to Rx. Patient expressed understanding of instruction given.        Visit Diagnoses:    ICD-10-CM ICD-9-CM   1. Pelvic and perineal pain  R10.2 625.9   2. Pain of pelvic girdle  R10.2 [id number]   3. SAADIA (stress urinary incontinence, female)  N39.3 625.6   4. Pelvic floor weakness  N81.89 618.89       Progress toward previous goals: Partially Met    Goals  Goals (to be achieved in 30 days)  1) Outcome measure (Female NIH-CPSI score) score  < 15/44 to represent overall improved function- 14/44 as of 11/24; MET  2) Report >25% improvement in pain with sitting MET  3) Independent in short term home exercise program and activity modification to improve symptoms MET    Long Term Goals (to be achieved within 90 days)   1) Outcome measure Female NIH-CPSI score) score  < 12/44 to represent overall improved function - 14/44 as of 11/24; MET  2) > good lumbopelvic muscle coordination to represent improved tolerance for sitting, care of infant, and sleep positions of sleep- progressing, needs additional time to meet goal  3) Patient to report >75% overall improvement in pain and leaking of urine - 50-60%, progressing  4) Patient independent in long term home exercise program and ADL modifications - progressing, needs  continued progression      Recommendations: Continue as planned; continue internal PF and progression of pelvic stab, progress to standing, dynamic standing   Timeframe: 1 month  Prognosis to achieve goals: good    PT Signature: RADHA Vega, PT , DPT, WCS (electronically signed) [place] License #[id number]  Certification Period2/2/2022  through 5/2/2022      Based upon review of the patient's progress and continued therapy plan, it is my medical opinion that Tosha Thurston should continue physical therapy treatment at [facility] PH[facility]  [address] [place] [id number] [phone].    Signature: __________________________________      Timed:  Manual Therapy:         mins  48495;  Therapeutic Exercise:    12     mins  12475;     Neuromuscular Anaya:    20    mins  23172;    Therapeutic Activity:     10     mins  26073;     Gait Training:           mins  04921;     Ultrasound:          mins  98876;    Electrical Stimulation:         mins  49737 ( );  Ultrasound:          mins  71205;    Ionto                                   mins   57699  Self Care                            mins   78845    Untimed:  Electrical Stimulation:         mins  15278 ( );  Mechanical Traction:         mins  21280;   Dry Needling          mins self-pay  Low Eval          Mins  48747  Mod Eval          Mins  09037  High Eval                            Mins  64511  Re-Eval                               mins  28008    Timed Treatment:  42    mins   Total Treatment:    42    mins

## 2022-02-02 NOTE — PATIENT INSTRUCTIONS
Access Code: [email or web address]  URL: [email or web address]  Date: 02/02/2022  Prepared by: Radha Vega    Exercises  Supine Transversus Abdominis Bracing with Leg Extension - 1 x daily - 7 x weekly - 1 sets - 10 reps - 5 hold

## 2022-02-09 ENCOUNTER — TREATMENT (OUTPATIENT)
Dept: PHYSICAL THERAPY | Facility: CLINIC | Age: 32
End: 2022-02-09

## 2022-02-09 DIAGNOSIS — N39.3 SUI (STRESS URINARY INCONTINENCE, FEMALE): ICD-10-CM

## 2022-02-09 DIAGNOSIS — R10.2 PAIN OF PELVIC GIRDLE: ICD-10-CM

## 2022-02-09 DIAGNOSIS — N81.89 PELVIC FLOOR WEAKNESS: ICD-10-CM

## 2022-02-09 DIAGNOSIS — R10.2 PELVIC AND PERINEAL PAIN: Primary | ICD-10-CM

## 2022-02-09 PROCEDURE — 97140 MANUAL THERAPY 1/> REGIONS: CPT | Performed by: PHYSICAL THERAPIST

## 2022-02-09 PROCEDURE — 97110 THERAPEUTIC EXERCISES: CPT | Performed by: PHYSICAL THERAPIST

## 2022-02-09 PROCEDURE — 97112 NEUROMUSCULAR REEDUCATION: CPT | Performed by: PHYSICAL THERAPIST

## 2022-02-09 NOTE — PROGRESS NOTES
PHYSICAL THERAPY DAILY PROGRESS NOTE    Patient: Tosha Thurston   : 1990  Diagnosis/ICD-10 Code:  Pelvic and perineal pain [R10.2]  Referring practitioner: Self Referring  Date of Initial Visit: Type: THERAPY  Noted: 10/27/2021  Today's Date: 2022  Patient seen for 12 sessions           Subjective Internal pelvic exam consent was obtained and patient declined need for second person in room. States pain flared while on cycle, but then improved when cycle ended (was on cycle last week, has now ended, pain currently 2/10, doing fine with exercises. Has not yet attempted penetrating intercourse. Agreeable to proposed plan of care.         Objective   See Exercise, Manual, and Modality Logs for complete treatment.   Min to no hypertone to L glute med (improved from prior sessions)  Jump response to internal palpation, able to lengthen on cue, slight resistance to single and double digit insertion initially (resistance decreased with cues for relaxation and for breath pattern to assist in pelvic floor relaxation),      Patient was educated in updated HEP (added cat cow c PB, child's pose with weight shift) handouts/supplies provided, discussed perineal massage and indications for graduated vaginal dilators, expected and potential response to Rx, indications for vaginal dilators, and guidelines for recreational exercise.    Assessment/Plan Today's treatment focused on progression of lumbopelvic motor control with patient tolerant to treatment; reported improvement noted but continues to demonstrate lumbopelvic strength/activation/coordination and soft tissue deficits contributing to symptoms. Needs to continue skilled PT to continue progress towards goals and to address symptoms limiting function.    Plan: Progress cat/cow/quadruped then to standing lumbopelvic coordination.       Visit Diagnoses:    ICD-10-CM ICD-9-CM   1. Pelvic and perineal pain  R10.2  625.9   2. Pain of pelvic girdle  R10.2 [id number]   3. SAADIA (stress urinary incontinence, female)  N39.3 625.6   4. Pelvic floor weakness  N81.89 618.89       Timed:  Manual Therapy:    14     mins  56228;  Therapeutic Exercise:   10     mins  67968;     Neuromuscular Anaya:    16    mins  78197;    Therapeutic Activity:          mins  43289;     Gait Training:           mins  57428;     Ultrasound:          mins  62695;    Electrical Stimulation:         mins  97035 ( );    Untimed:  Electrical Stimulation:         mins  17051 ( );  Mechanical Traction:         mins  06303;   Dry Needling          mins self-pay  Traction          mins 52109  Low Eval          Mins  63690  Mod Eval          Mins  79205  High Eval                            Mins  59244  Re-Eval                               mins  87382    Timed Treatment:  40    mins   Total Treatment:    40    mins    PT SIGNATURE: RADHA Vega PT, DPT, WCS (electronically signed) [place] License #[id number]  DATE: 2/9/2022

## 2022-02-16 ENCOUNTER — TREATMENT (OUTPATIENT)
Dept: PHYSICAL THERAPY | Facility: CLINIC | Age: 32
End: 2022-02-16

## 2022-02-16 DIAGNOSIS — N39.3 SUI (STRESS URINARY INCONTINENCE, FEMALE): ICD-10-CM

## 2022-02-16 DIAGNOSIS — N81.89 PELVIC FLOOR WEAKNESS: ICD-10-CM

## 2022-02-16 DIAGNOSIS — R10.2 PELVIC AND PERINEAL PAIN: Primary | ICD-10-CM

## 2022-02-16 DIAGNOSIS — R10.2 PAIN OF PELVIC GIRDLE: ICD-10-CM

## 2022-02-16 PROCEDURE — 97112 NEUROMUSCULAR REEDUCATION: CPT | Performed by: PHYSICAL THERAPIST

## 2022-02-16 PROCEDURE — 97110 THERAPEUTIC EXERCISES: CPT | Performed by: PHYSICAL THERAPIST

## 2022-02-16 PROCEDURE — 97140 MANUAL THERAPY 1/> REGIONS: CPT | Performed by: PHYSICAL THERAPIST

## 2022-02-16 NOTE — PATIENT INSTRUCTIONS
Access Code: [email or web address]  URL: [email or web address]  Date: 02/16/2022  Prepared by: Radha Vega    Program Notes  Initiate movement (do not complete)       Exercises  Forward T - 2 x daily - 7 x weekly - 2 sets - 10 reps - 5 hold

## 2022-02-16 NOTE — PROGRESS NOTES
PHYSICAL THERAPY DAILY PROGRESS NOTE    Patient: Tosha Thurston   : 1990  Diagnosis/ICD-10 Code:  Pelvic and perineal pain [R10.2]  Referring practitioner: Self Referring  Date of Initial Visit: Type: THERAPY  Noted: 10/27/2021  Today's Date: 2022  Patient seen for 13 sessions           Subjective Pain with sitting improving, duration of ability to sit improved but depends on surface/amount of support. No report re: intercourse, has not pursued penetrating intercourse.  Internal pelvic exam consent was obtained and patient declined need for second person in room.      Objective Innominates appear aligned. Asymmetric WS and stability with SLS (see flow sheet); decreased stability L vs R.  Min tissue resistance, tenderness and hypertone to to R PC, tolerant to single and double digit insertion, min/trace PF contraction in hooklying.     See Exercise, Manual, and Modality Logs for complete treatment.     Patient was educated in updated HEP (added modified forward T with PB) with handouts/supplies provided, expected and potential response to Rx, and goals and plan were reviewed with the patient expressing understanding/agreement. Instructed in/reviewed indications for dilators integrated into PT program.      Assessment/Plan Today's treatment focused on progressing stability HEP to address current findings and manual techniques to address self care deficits  with patient tolerant to treatment without increase in pain and with improving pelvic floor mobility noted. Needs to continue skilled PT to continue progress towards goals and to address symptoms limiting function.  Plan:Progress qped or standing pelvic stability, discuss/review indications for dilators and proceed according to pt preference.        Visit Diagnoses:    ICD-10-CM ICD-9-CM   1. Pelvic and perineal pain  R10.2 625.9   2. Pain of pelvic girdle  R10.2 [id number]   3. SAADIA (stress urinary  incontinence, female)  N39.3 625.6   4. Pelvic floor weakness  N81.89 618.89       Timed:  Manual Therapy:    15     mins  91417;  Therapeutic Exercise:    9     mins  53633;     Neuromuscular Anaya:   16    mins  33847;    Therapeutic Activity:          mins  34517;     Gait Training:           mins  47791;     Ultrasound:          mins  37241;    Electrical Stimulation:         mins  37985 ( );    Untimed:  Electrical Stimulation:         mins  45604 ( );  Mechanical Traction:         mins  40635;   Dry Needling          mins self-pay  Traction          mins 24500  Low Eval          Mins  37746  Mod Eval          Mins  11852  High Eval                            Mins  52059  Re-Eval                               mins  03819    Timed Treatment:  40    mins   Total Treatment:    40    mins    PT SIGNATURE: RADHA Vega PT, DPT, WCS (electronically signed) [place] License #[id number]  DATE: 2/16/2022

## 2022-03-09 ENCOUNTER — TREATMENT (OUTPATIENT)
Dept: PHYSICAL THERAPY | Facility: CLINIC | Age: 32
End: 2022-03-09

## 2022-03-09 DIAGNOSIS — R10.2 PELVIC AND PERINEAL PAIN: Primary | ICD-10-CM

## 2022-03-09 DIAGNOSIS — N81.89 PELVIC FLOOR WEAKNESS: ICD-10-CM

## 2022-03-09 DIAGNOSIS — N39.3 SUI (STRESS URINARY INCONTINENCE, FEMALE): ICD-10-CM

## 2022-03-09 DIAGNOSIS — R10.2 PAIN OF PELVIC GIRDLE: ICD-10-CM

## 2022-03-09 PROCEDURE — 97535 SELF CARE MNGMENT TRAINING: CPT | Performed by: PHYSICAL THERAPIST

## 2022-03-09 PROCEDURE — 97112 NEUROMUSCULAR REEDUCATION: CPT | Performed by: PHYSICAL THERAPIST

## 2022-03-09 PROCEDURE — 97140 MANUAL THERAPY 1/> REGIONS: CPT | Performed by: PHYSICAL THERAPIST

## 2022-03-09 NOTE — PROGRESS NOTES
PT Re-Assessment / Re-Certification        Patient: Tosha Thurston   : 1990  Diagnosis/ICD-10 Code:  Pelvic and perineal pain [R10.2]  Referring practitioner: Self Referring  Date of Initial Visit: Type: THERAPY  Noted: 10/27/2021  Today's Date: 3/9/2022  Patient seen for 14 sessions      Subjective:     Subjective Questionnaire: Female NIH-CPSI score 13 /44  Clinical Progress: improved  Home Program Compliance: Yes  Treatment has included: therapeutic exercise, neuromuscular re-education, manual therapy and therapeutic activity self care/pt education    Subjective Symptoms overall 50-60% improved, can sit and lift and transport infant without pain, pain is mostly at end of day and related to work activities, can do exercises as instructed to improve pain. No new report re: intercourse, tampon use use, or well women exam pain. Attempting recreational exercise, interesting in progressing but wants to do safely.  Agreeable to proposed plan of care. Internal pelvic exam consent was obtained and patient declined need for second person in room.    Objective   Seated posture: sits with WBing evenly between ischial tubersosities    Palpation   Innominates appear aligned         Muscle Activation   Additional Muscle Activation Details  Internal pelvic exam reveals  Tenderness and hypertone to R STP, R OI,  No tissue restriction to single digit inserton; min to no resistance with double digit insertion.  Good TA and pelvic floor engagement in hooklying (required verbal cues, then able to achieve 2/5 and hold 5s)  Fair stability with SLS to mini RDL     See Exercise, Manual, and Modality Logs for complete treatment. Internal pelvic health consent obtained and declined need for second person in room or C/P to internal pelvic exam.   Assessment/Plan    Positive response to PT as evidenced by improved pelvic floor mobility, coordination, and activity tolerance; she needs to continue skilled PT intervention to improve  endurance and continue improvements in motor control so as to return to full function (work duties, care of infant) and self care without limitation. See goal status below, progressing toward goals but needs to continue PT to achieve goals. Ilness this past assessment period potentially impacted progress. Goals and plan were discussed with the patient with the patient expressing understanding/agreement.     Patient was instructed in updated HEP( MIKAEL ambrosio RDL staff c [facility]) clinical findings of today's encounter, goals and plan of care, proper lumbopelvic positioning to improve/maximize lumbopelvic stability, indications for vaginal dilators, safe performance of recreational exercise and options to promote lumbopelvic stability, and potential and expected response to Rx. Patient expressed understanding of instruction given.        Visit Diagnoses:    ICD-10-CM ICD-9-CM   1. Pelvic and perineal pain  R10.2 625.9   2. Pain of pelvic girdle  R10.2 [id number]   3. SAADIA (stress urinary incontinence, female)  N39.3 625.6   4. Pelvic floor weakness  N81.89 618.89       Progress toward previous goals: Partially Met    Goals  Goals (to be achieved in 30 days)  1) Outcome measure (Female NIH-CPSI score) score  < 15/44 to represent overall improved function- 13/44 as of 3/9; MET  2) Report >25% improvement in pain with sitting MET  3) Independent in short term home exercise program and activity modification to improve symptoms MET    Long Term Goals (to be achieved within 90 days)   1) Outcome measure Female NIH-CPSI score) score  < 12/44 to represent overall improved function - 13/44 as of 3/9; MET  2) > good lumbopelvic muscle coordination to represent improved tolerance for sitting, care of infant, and sleep positions of sleep- progressing, needs additional time to meet goal  3) Patient to report >75% overall improvement in pain and leaking of urine - 50-60%, progressing  4) Patient independent in long term home exercise program  and ADL modifications - progressing, needs continued progression      Recommendations: Continue as planned;  continue internal PF and progression of pelvic stab, progress to standing, dynamic standing     Timeframe: 1 month  Prognosis to achieve goals: good    PT Signature: RADHA Vega, PT , DPT, WCS (electronically signed) [place] License #[id number]  Certification Period3/9/2022  through 6/6/2022      Based upon review of the patient's progress and continued therapy plan, it is my medical opinion that Tosha Thurston should continue physical therapy treatment at [facility] PHYS[facility] THERAP[facility]  [address] [place] [id number] [phone].    Signature: __________________________________  Referring, Self    Timed:  Manual Therapy:    18     mins  10068;  Therapeutic Exercise:         mins  85775;     Neuromuscular Anaya:    14    mins  84768;    Therapeutic Activity:          mins  15117;     Gait Training:           mins  91637;     Ultrasound:          mins  90580;    Electrical Stimulation:         mins  00066 ( );  Ultrasound:          mins  09412;    Ionto                                   mins   62433  Self Care                       10     mins   60385    Untimed:  Electrical Stimulation:         mins  48024 ( );  Mechanical Traction:         mins  42095;   Dry Needling          mins self-pay  Low Eval          Mins  15120  Mod Eval          Mins  74596  High Eval                            Mins  50012  Re-Eval                               mins  79509    Timed Treatment:  42    mins   Total Treatment:    42    mins

## 2022-03-16 ENCOUNTER — TREATMENT (OUTPATIENT)
Dept: PHYSICAL THERAPY | Facility: CLINIC | Age: 32
End: 2022-03-16

## 2022-03-16 DIAGNOSIS — R10.2 PELVIC AND PERINEAL PAIN: Primary | ICD-10-CM

## 2022-03-16 DIAGNOSIS — N39.3 SUI (STRESS URINARY INCONTINENCE, FEMALE): ICD-10-CM

## 2022-03-16 DIAGNOSIS — N81.89 PELVIC FLOOR WEAKNESS: ICD-10-CM

## 2022-03-16 DIAGNOSIS — R10.2 PAIN OF PELVIC GIRDLE: ICD-10-CM

## 2022-03-16 PROCEDURE — 97140 MANUAL THERAPY 1/> REGIONS: CPT | Performed by: PHYSICAL THERAPIST

## 2022-03-16 PROCEDURE — 97110 THERAPEUTIC EXERCISES: CPT | Performed by: PHYSICAL THERAPIST

## 2022-03-16 PROCEDURE — 97112 NEUROMUSCULAR REEDUCATION: CPT | Performed by: PHYSICAL THERAPIST

## 2022-03-16 NOTE — PROGRESS NOTES
PHYSICAL THERAPY DAILY PROGRESS NOTE    Patient: Tosha Thurston   : 1990  Diagnosis/ICD-10 Code:  Pelvic and perineal pain [R10.2]  Referring practitioner: Self Referring  Date of Initial Visit: Type: THERAPY  Noted: 10/27/2021  Today's Date: 3/16/2022  Patient seen for 15 sessions             Subjective Feels pain with sitting is better, was able to have intercourse without pain during or after Internal pelvic exam consent was obtained and patient declined need for second person in room. No pain during or after Rx.       Objective   Innominates appear aligned, mild hypertone R BC, PC (improved with manual Rx); able to maintain 8s PB in standing.  See Exercise, Manual, and Modality Logs for complete treatment.     Patient was educated in updated HEP (sitting or standing 10s PB) , expected and potential response to Rx, and goals and plan were reviewed with the patient expressing understanding/agreement. Instructed in/reviewed proper pelvic floor engagement vs contraction with work activities, ADLs, and self care.      Assessment/Plan  Improved symptom report and tolerant to treatment without increase in pain and with improved motor control noted. Needs to continue skilled PT to continue progress towards goals and to address symptoms limiting function.    Plan: Progress PB and hip stability/strenghtening as tolerated.    Visit Diagnoses:    ICD-10-CM ICD-9-CM   1. Pelvic and perineal pain  R10.2 625.9   2. Pain of pelvic girdle  R10.2 [id number]   3. SAADIA (stress urinary incontinence, female)  N39.3 625.6   4. Pelvic floor weakness  N81.89 618.89       Timed:  Manual Therapy:   13      mins  95460;  Therapeutic Exercise:    12     mins  45851;     Neuromuscular Anaya: 18     mins  88760;    Therapeutic Activity:          mins  85125;     Gait Training:           mins  82689;     Ultrasound:          mins  46361;    Electrical Stimulation:         mins  49685 (  );    Untimed:  Electrical Stimulation:         mins  50345 ( );  Mechanical Traction:         mins  80720;   Dry Needling          mins self-pay  Traction          mins 79248  Low Eval          Mins  64896  Mod Eval          Mins  94235  High Eval                            Mins  81636  Re-Eval                               mins  04470    Timed Treatment:  43    mins   Total Treatment:     43   mins    PT SIGNATURE: RADHA Vega PT, DPT, WCS (electronically signed) [place] License #[id number]  DATE: 3/16/2022

## 2022-04-06 ENCOUNTER — TREATMENT (OUTPATIENT)
Dept: PHYSICAL THERAPY | Facility: CLINIC | Age: 32
End: 2022-04-06

## 2022-04-06 DIAGNOSIS — R10.2 PAIN OF PELVIC GIRDLE: ICD-10-CM

## 2022-04-06 DIAGNOSIS — N39.3 SUI (STRESS URINARY INCONTINENCE, FEMALE): ICD-10-CM

## 2022-04-06 DIAGNOSIS — N81.89 PELVIC FLOOR WEAKNESS: ICD-10-CM

## 2022-04-06 DIAGNOSIS — R10.2 PELVIC AND PERINEAL PAIN: Primary | ICD-10-CM

## 2022-04-06 PROCEDURE — 97140 MANUAL THERAPY 1/> REGIONS: CPT | Performed by: PHYSICAL THERAPIST

## 2022-04-06 PROCEDURE — 97112 NEUROMUSCULAR REEDUCATION: CPT | Performed by: PHYSICAL THERAPIST

## 2022-04-06 PROCEDURE — 97110 THERAPEUTIC EXERCISES: CPT | Performed by: PHYSICAL THERAPIST

## 2022-04-06 NOTE — PATIENT INSTRUCTIONS
Access Code: [email or web address]  URL: [email or web address]  Date: 04/06/2022  Prepared by: Radha Vega    Program Notes  Keep head upright throughout, do not clasp hands behind back      Exercises  Seated Sciatic Tensioner - 1 x daily - 7 x weekly - 1 sets - 10 reps - 5 hold

## 2022-04-06 NOTE — PROGRESS NOTES
PHYSICAL THERAPY DAILY PROGRESS NOTE    Patient: Tosha Thurston   : 1990  Diagnosis/ICD-10 Code:  Pelvic and perineal pain [R10.2]  Referring practitioner: Self Referring  Date of Initial Visit: Type: THERAPY  Noted: 10/27/2021  Today's Date: 2022  Patient seen for 16 sessions           Subjective Feels pain with sitting and self care are still present but continuing to improve, still feeling challenged by pelvic floor exercises, can feel pelvic floor foreign and relaxing upon initiation of exercises, but feels muscle fatigues after a few reps.  Internal pelvic exam consent was obtained and patient declined need for second person in room. No pain after Rx.       Objective Innominates appear aligned in supine, standing innom rotation test positive for L ant innom. Negative pubic shotgun. ttp and hypertonic L PF; able to contract and hold 7s and relax on command, decreased contraction intensity after 5 reps in standing.  Postive L NDT L sciatic n.     See Exercise, Manual, and Modality Logs for complete treatment.     Patient was educated in updated HEP (added seated sciatic nn mob- see flow sheets, reviewed latency and to keep within pain free/symptom free range) with handouts/supplies provided, expected and potential response to Rx, recreational exercise guidelines, and goals and plan were reviewed with the patient expressing understanding/agreement.      Assessment/Plan Continued improvement in presenting symptoms with patient tolerant to treatment without increase in pain and with improved activity tolerance noted; needs to continue skilled PT to continue progress towards goals and to address symptoms limiting function. Potential to transition to maintenance PT     Plan: Progress standing PF to 10 hold and add dynamic movement as tolerated.       Visit Diagnoses:    ICD-10-CM ICD-9-CM   1. Pelvic and perineal pain  R10.2 625.9   2. Pain of pelvic  girdle  R10.2 [id number]   3. SAADIA (stress urinary incontinence, female)  N39.3 625.6   4. Pelvic floor weakness  N81.89 618.89       Timed:  Manual Therapy:    14     mins  35925;  Therapeutic Exercise:    9     mins  48622;     Neuromuscular Anaya:    19    mins  61937;    Therapeutic Activity:          mins  79507;     Gait Training:           mins  58116;     Ultrasound:          mins  65550;    Electrical Stimulation:         mins  76885 ( );    Untimed:  Electrical Stimulation:         mins  61539 ( );  Mechanical Traction:         mins  98623;   Dry Needling          mins self-pay  Traction          mins 66528  Low Eval          Mins  92209  Mod Eval          Mins  04830  High Eval                            Mins  61341  Re-Eval                               mins  09288    Timed Treatment:  42    mins   Total Treatment:    42    mins    PT SIGNATURE: RADHA Vega PT, DPT, WCS (electronically signed) [place] License #[id number]   DATE: 4/6/2022

## 2022-04-13 ENCOUNTER — TREATMENT (OUTPATIENT)
Dept: PHYSICAL THERAPY | Facility: CLINIC | Age: 32
End: 2022-04-13

## 2022-04-13 DIAGNOSIS — N81.89 PELVIC FLOOR WEAKNESS: ICD-10-CM

## 2022-04-13 DIAGNOSIS — N39.3 SUI (STRESS URINARY INCONTINENCE, FEMALE): ICD-10-CM

## 2022-04-13 DIAGNOSIS — R10.2 PAIN OF PELVIC GIRDLE: ICD-10-CM

## 2022-04-13 DIAGNOSIS — R10.2 PELVIC AND PERINEAL PAIN: Primary | ICD-10-CM

## 2022-04-13 PROCEDURE — 97112 NEUROMUSCULAR REEDUCATION: CPT | Performed by: PHYSICAL THERAPIST

## 2022-04-13 PROCEDURE — 97110 THERAPEUTIC EXERCISES: CPT | Performed by: PHYSICAL THERAPIST

## 2022-04-13 PROCEDURE — 97140 MANUAL THERAPY 1/> REGIONS: CPT | Performed by: PHYSICAL THERAPIST

## 2022-04-13 NOTE — PROGRESS NOTES
PHYSICAL THERAPY DAILY PROGRESS NOTE/Reassessment    Patient: Tosha Thurston   : 1990  Diagnosis/ICD-10 Code:  Pelvic and perineal pain [R10.2]  Referring practitioner: Self Referring  Date of Initial Visit: Type: THERAPY  Noted: 10/27/2021  Today's Date: 2022  Patient seen for 17 sessions           Subjective Was ill with vomiting over the weekend, better now. No new report re: SA, posterior hip pain slightly exacerbated during that time which she feels was due to vomiting, 5/10 prior to Rx, 2/10 after. Internal pelvic exam consent was obtained and patient declined need for second person in room.    Objective L ant R post innom  Negative pubic shotgun;  ttp L glute med  Internal pelvic floor Rx initiated but then d/cd, area of redness at internal labia (informed patient and advised re: potential need for medical consult), external palpation in standing: able to hold pelvic floor contraction 10s and relax on command. Negative L NDT L sciatic n.   Good TA and pelvic floor engagement in hooklying (required verbal cues, then able to achieve 2/5 and hold 5s)       See Exercise, Manual, and Modality Logs for complete treatment.       Assessment/Plan Progressing toward goals overall with improved motor control noted; needs to continue skilled PT to continue progress towards goals and to address symptoms limiting function.Slight flare in pain potentially due to illness is past few days; today's intervention's chosen to address current presentation.    Goals:   1) Outcome measure (Female NIH-CPSI score) score  < 15/44 to represent overall improved function- 13/44 as of 3/9; MET  2) Report >25% improvement in pain with sitting MET  3) Independent in short term home exercise program and activity modification to improve symptoms MET    Long Term Goals (to be achieved within 90 days)   1) Outcome measure Female NIH-CPSI score) score  < 12/44 to represent overall  improved function - 13/44 as of 3/9; MET  2) > good lumbopelvic muscle coordination to represent improved tolerance for sitting, care of infant, and sleep positions of sleep- progressing, needs additional time to meet goal  3) Patient to report >75% overall improvement in pain and leaking of urine - 50-60%, progressing  4) Patient independent in long term home exercise program and ADL modifications - progressing, needs continued progression                Recommendations: Continue per POC as recommended.      Timeframe: 1 month  Prognosis to achieve goals: good      Visit Diagnoses:    ICD-10-CM ICD-9-CM   1. Pelvic and perineal pain  R10.2 625.9   2. Pain of pelvic girdle  R10.2 [id number]   3. SAADIA (stress urinary incontinence, female)  N39.3 625.6   4. Pelvic floor weakness  N81.89 618.89       Timed:  Manual Therapy:    16     mins  41435;  Therapeutic Exercise:    10     mins  30312;     Neuromuscular Anaya:    15    mins  91673;    Therapeutic Activity:          mins  04990;     Gait Training:           mins  18025;     Ultrasound:          mins  41066;    Electrical Stimulation:         mins  82293 ( );    Untimed:  Electrical Stimulation:         mins  35526 ( );  Mechanical Traction:         mins  79641;   Dry Needling          mins self-pay  Traction          mins 35390  Low Eval          Mins  76151  Mod Eval          Mins  51429  High Eval                            Mins  99072  Re-Eval                               mins  39111    Timed Treatment:  41    mins   Total Treatment:    41    mins    PT SIGNATURE: RADHA Vega PT, DPT, WCS (electronically signed) [place] License #[id number]  DATE: 4/13/2022

## 2022-05-04 ENCOUNTER — TREATMENT (OUTPATIENT)
Dept: PHYSICAL THERAPY | Facility: CLINIC | Age: 32
End: 2022-05-04

## 2022-05-04 DIAGNOSIS — R10.2 PELVIC AND PERINEAL PAIN: Primary | ICD-10-CM

## 2022-05-04 DIAGNOSIS — R10.2 PAIN OF PELVIC GIRDLE: ICD-10-CM

## 2022-05-04 DIAGNOSIS — N81.89 PELVIC FLOOR WEAKNESS: ICD-10-CM

## 2022-05-04 DIAGNOSIS — N39.3 SUI (STRESS URINARY INCONTINENCE, FEMALE): ICD-10-CM

## 2022-05-04 PROCEDURE — 97112 NEUROMUSCULAR REEDUCATION: CPT | Performed by: PHYSICAL THERAPIST

## 2022-05-04 PROCEDURE — 97140 MANUAL THERAPY 1/> REGIONS: CPT | Performed by: PHYSICAL THERAPIST

## 2022-05-04 PROCEDURE — 97530 THERAPEUTIC ACTIVITIES: CPT | Performed by: PHYSICAL THERAPIST

## 2022-05-04 NOTE — PROGRESS NOTES
PT Re-Assessment / Re-Certification        Patient: Tosha Thurston   : 1990  Diagnosis/ICD-10 Code:  Pelvic and perineal pain [R10.2]  Referring practitioner: Self Referring  Date of Initial Visit: Type: THERAPY  Noted: 10/27/2021  Today's Date: 2022  Patient seen for 18 sessions      Subjective:       Clinical Progress: improved  Home Program Compliance: Yes  Treatment has included: therapeutic exercise, neuromuscular re-education, manual therapy and therapeutic activity self care/pt education    Subjective States able to consistently participate in intercourse without pain, still having some pain in groin/pelvic area with sitting (as for work ) and with care of child, pain overall 50% improved and is not leaking urine, pelvic exam consent was obtained and patient declined need for second person in room.  Objective   Innominates appear aligned  Internal pelvic floor assessment reveals: min to no hypertonicity or pain w palpation, pelvic floor MMT 2/5, able to hold pelvic floor contraction 5s and relax on command. external palpation in standing: able to hold pelvic floor contraction 10s and relax on command.  Negative L NDT L sciatic n.   See Exercise, Manual, and Modality Logs for complete treatment. Internal pelvic health consent obtained and declined need for second person in room or C/P to internal pelvic exam.   Assessment/Plan Significant improvement in self care tolerance/activity tolerance and with pelvic floor mobility/tone noted; progressing toward goals overall with improved motor control noted. Needs to continue skilled PT to continue progress towards goals and to address symptoms limiting function (care of infant and work duties). Patient was instructed in clinical findings of today's encounter, goals and plan of care, updated HEP, and potential and expected response to Rx,. Patient expressed understanding of instruction given.        Visit Diagnoses:    ICD-10-CM ICD-9-CM   1. Pelvic and  perineal pain  R10.2 625.9   2. Pain of pelvic girdle  R10.2 [id number]   3. SAADIA (stress urinary incontinence, female)  N39.3 625.6   4. Pelvic floor weakness  N81.89 618.89       Progress toward previous goals: Partially Met  Short Term Goals:  1) Outcome measure (Female NIH-CPSI score) score  < 15/44 to represent overall improved function- 13/44 as of 3/9; MET  2) Report >25% improvement in pain with sitting MET  3) Independent in short term home exercise program and activity modification to improve symptoms MET    Long Term Goals (to be achieved within 90 days)   1) Outcome measure Female NIH-CPSI score) score  < 12/44 to represent overall improved function - 13/44 as of 3/9; MET  2) > good lumbopelvic muscle coordination to represent improved tolerance for sitting, care of infant, and sleep positions of sleep- progressing, needs additional time to meet goal  3) Patient to report >75% overall improvement in pain and leaking of urine - 50-60%, progressing  4) Patient independent in long term home exercise program and ADL modifications - progressing, needs continued progression  Recommendations: Continue as planned; continue stability focused progression  Timeframe: 1 month  Prognosis to achieve goals: good    PT Signature: RADHA Vega, PT , DPT, WCS (electronically signed) [place] License #[id number]      Based upon review of the patient's progress and continued therapy plan, it is my medical opinion that Tosha Thurston should continue physical therapy treatment at [facility] PHYSICAL THERAPY  [address] [place] [id number] [phone].    Signature: __________________________________  Referring, Self    Timed:  Manual Therapy:    16     mins  63196;  Therapeutic Exercise:         mins  29268;     Neuromuscular Anaya:  14    mins  20512;    Therapeutic Activity:    12     mins  75342;     Gait Training:           mins  32708;     Ultrasound:          mins  57929;    Electrical  Stimulation:         mins  93168 ( );  Ultrasound:          mins  80449;    Ionto                                   mins   02579  Self Care                            mins   99106    Untimed:  Electrical Stimulation:         mins  15439 ( );  Mechanical Traction:         mins  00750;   Dry Needling          mins self-pay  Low Eval          Mins  76485  Mod Eval          Mins  70623  High Eval                            Mins  82384  Re-Eval                               mins  55142    Timed Treatment:  42    mins   Total Treatment:    42    mins

## 2022-05-11 ENCOUNTER — TREATMENT (OUTPATIENT)
Dept: PHYSICAL THERAPY | Facility: CLINIC | Age: 32
End: 2022-05-11

## 2022-05-11 DIAGNOSIS — N81.89 PELVIC FLOOR WEAKNESS: ICD-10-CM

## 2022-05-11 DIAGNOSIS — R10.2 PELVIC AND PERINEAL PAIN: Primary | ICD-10-CM

## 2022-05-11 DIAGNOSIS — N39.3 SUI (STRESS URINARY INCONTINENCE, FEMALE): ICD-10-CM

## 2022-05-11 DIAGNOSIS — R10.2 PAIN OF PELVIC GIRDLE: ICD-10-CM

## 2022-05-11 PROCEDURE — 97140 MANUAL THERAPY 1/> REGIONS: CPT | Performed by: PHYSICAL THERAPIST

## 2022-05-11 PROCEDURE — 97110 THERAPEUTIC EXERCISES: CPT | Performed by: PHYSICAL THERAPIST

## 2022-05-11 PROCEDURE — 97112 NEUROMUSCULAR REEDUCATION: CPT | Performed by: PHYSICAL THERAPIST

## 2022-05-11 NOTE — PROGRESS NOTES
PHYSICAL THERAPY DAILY PROGRESS NOTE    Patient: Tosha Thursotn   : 1990  Diagnosis/ICD-10 Code:  Pelvic and perineal pain [R10.2]  Referring practitioner: Self Referring  Date of Initial Visit: Type: THERAPY  Noted: 10/27/2021  Today's Date: 2022  Patient seen for 19 sessions           Subjective Continuing to have not have pain with intercourse, pain with sitting is improved, occurring mostly at end of day at at time with certain movements and positions and also with carrying 8 month old. Agreeable to proposed plan of care.            Objective Innominates appear aligned, negative pubic shotgun, negative L NDT sciatic bias, palpable pelvic floor contraction and relaxation on cue with cow/cat/down dog.     See Exercise, Manual, and Modality Logs for complete treatment.     Instructed in/reviewed proper positioning and pelvic floor activation vs relaxation  with work activities, ADLs, and self care.      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 .    Plan: Standing pelvic brace progression (Xochitl parker) next Rx.       Visit Diagnoses:    ICD-10-CM ICD-9-CM   1. Pelvic and perineal pain  R10.2 625.9   2. Pain of pelvic girdle  R10.2 [id number]   3. SAADIA (stress urinary incontinence, female)  N39.3 625.6   4. Pelvic floor weakness  N81.89 618.89       Timed:  Manual Therapy:     14    mins  55604;  Therapeutic Exercise:    12     mins  29892;     Neuromuscular Anaya:   15     mins  76987;    Therapeutic Activity:          mins  88398;     Gait Training:           mins  67267;     Ultrasound:          mins  58876;    Electrical Stimulation:         mins  92969 (  );    Untimed:  Electrical Stimulation:         mins  02245 ( );  Mechanical Traction:         mins  85855;   Dry Needling          mins self-pay  Traction          mins 13318  Low Eval          Mins  97706  Mod Eval          Mins  20796  High Eval                            Mins  94120  Re-Eval                               mins  47674    Timed Treatment:   41   mins   Total Treatment:     41   mins    PT SIGNATURE: RADHA Vega PT, DPT, WCS (electronically signed) [place] License #[id number]  DATE: 5/11/2022

## 2022-05-18 ENCOUNTER — TREATMENT (OUTPATIENT)
Dept: PHYSICAL THERAPY | Facility: CLINIC | Age: 32
End: 2022-05-18

## 2022-05-18 DIAGNOSIS — N39.3 SUI (STRESS URINARY INCONTINENCE, FEMALE): ICD-10-CM

## 2022-05-18 DIAGNOSIS — R10.2 PELVIC AND PERINEAL PAIN: Primary | ICD-10-CM

## 2022-05-18 DIAGNOSIS — N81.89 PELVIC FLOOR WEAKNESS: ICD-10-CM

## 2022-05-18 DIAGNOSIS — R10.2 PAIN OF PELVIC GIRDLE: ICD-10-CM

## 2022-05-18 PROCEDURE — 97530 THERAPEUTIC ACTIVITIES: CPT | Performed by: PHYSICAL THERAPIST

## 2022-05-18 PROCEDURE — 97112 NEUROMUSCULAR REEDUCATION: CPT | Performed by: PHYSICAL THERAPIST

## 2022-05-18 PROCEDURE — 97140 MANUAL THERAPY 1/> REGIONS: CPT | Performed by: PHYSICAL THERAPIST

## 2022-05-18 NOTE — PATIENT INSTRUCTIONS
Access Code: [email or web address]  URL: [email or web address]  Date: 05/18/2022  Prepared by: Radha Vega    Program Notes  Engage abdominals and pelvic floor prior to moving band, relax in between.  Repeat as below, lifting from anchor side hip to opposite shoulder.       Exercises  Standing Anti-Rotation Press with Anchored Resistance - 2 x daily - 7 x weekly - 1 sets - 10 reps - 5 hold

## 2022-05-18 NOTE — PROGRESS NOTES
PHYSICAL THERAPY DAILY PROGRESS NOTE    Patient: Tosha Thurston   : 1990  Diagnosis/ICD-10 Code:  Pelvic and perineal pain [R10.2]  Referring practitioner: Self Referring  Date of Initial Visit: Type: THERAPY  Noted: 10/27/2021  Today's Date: 2022  Patient seen for 20 sessions           Subjective Pain with care of infant and with work duties improved, continuing to participate in intercourse without pain, expressed understanding of instruction given.         Objective Negative pubic shotgun, mild ant R, post L.        See Exercise, Manual, and Modality Logs for complete treatment.     Patient was educated in updated HEP (added Paloff and Paloff lift)  with handouts/supplies provided, expected and potential response to Rx, and goals and plan were reviewed with the patient expressing understanding/agreement. Reviewed proper lumbopelvic activation/positioning mechanics with care of child.      Assessment/Plan Contnued improvement noted and tolerant to progression of stabilization/stregthening exerciseswithout increase in pain and with proper activation noted. Needs to continue skilled PT to continue progress towards goals and to address symptoms limiting function.    Plan: Progress to rows and Paloff step outs next Rx, assess need for internal pelvic floor assessment.       Visit Diagnoses:    ICD-10-CM ICD-9-CM   1. Pelvic and perineal pain  R10.2 625.9   2. Pain of pelvic girdle  R10.2 [id number]   3. SAADIA (stress urinary incontinence, female)  N39.3 625.6   4. Pelvic floor weakness  N81.89 618.89       Timed:  Manual Therapy:   12     mins  46691;  Therapeutic Exercise:        mins  79649;     Neuromuscular Anaya: 15     mins  48954;    Therapeutic Activity:    15      mins  29945;     Gait Training:           mins  57838;     Ultrasound:          mins  60569;    Electrical Stimulation:         mins  83619 ( );    Untimed:  Electrical  Stimulation:         mins  70692 ( );  Mechanical Traction:         mins  60971;   Dry Needling          mins self-pay  Traction          mins 89515  Low Eval          Mins  46708  Mod Eval          Mins  68077  High Eval                            Mins  23405  Re-Eval                               mins  14858    Timed Treatment: 42     mins   Total Treatment:    42    mins    PT SIGNATURE: RADHA Vega PT, DPT, WCS (electronically signed) [place] License #[id number]  DATE: 5/18/2022

## 2022-06-08 ENCOUNTER — TREATMENT (OUTPATIENT)
Dept: PHYSICAL THERAPY | Facility: CLINIC | Age: 32
End: 2022-06-08

## 2022-06-08 DIAGNOSIS — N81.89 PELVIC FLOOR WEAKNESS: ICD-10-CM

## 2022-06-08 DIAGNOSIS — N39.3 SUI (STRESS URINARY INCONTINENCE, FEMALE): ICD-10-CM

## 2022-06-08 DIAGNOSIS — R10.2 PELVIC AND PERINEAL PAIN: Primary | ICD-10-CM

## 2022-06-08 DIAGNOSIS — R10.2 PAIN OF PELVIC GIRDLE: ICD-10-CM

## 2022-06-08 PROCEDURE — 97110 THERAPEUTIC EXERCISES: CPT | Performed by: PHYSICAL THERAPIST

## 2022-06-08 PROCEDURE — 97530 THERAPEUTIC ACTIVITIES: CPT | Performed by: PHYSICAL THERAPIST

## 2022-06-08 PROCEDURE — 97112 NEUROMUSCULAR REEDUCATION: CPT | Performed by: PHYSICAL THERAPIST

## 2022-06-08 NOTE — PATIENT INSTRUCTIONS
Access Code: [email or web address]  URL: [email or web address]  Date: 06/08/2022  Prepared by: Radha Vega    Program Notes  Engage pelvic floor prior to movement, hold through 3 or 4 leg movements, then relax.       Exercises  Hip Extension with Resistance Loop - 1 x daily - 7 x weekly - 1 sets - 12 reps  Standing Hip Abduction Kicks - 1 x daily - 7 x weekly - 1 sets - 12 reps

## 2022-06-08 NOTE — PROGRESS NOTES
PT Re-Assessment / Re-Certification        Patient: Tosha Thurston   : 1990  Diagnosis/ICD-10 Code:  Pelvic and perineal pain [R10.2]  Referring practitioner: Self Referring  Date of Initial Visit: Type: THERAPY  Noted: 10/27/2021  Today's Date: 2022  Patient seen for 21 sessions      Subjective:     Clinical Progress: improved  Home Program Compliance: Yes  Treatment has included: therapeutic exercise, neuromuscular re-education, manual therapy and therapeutic activity self care/pt education    Subjective Continues to be able to have in intercourse without pain, able to perform work duties and  care for infant without pain, and able to participate in recreational exercise without pain. Was able to hike 5 miles without pain. Overall 80% improved and is not leaking urine.  Objective   Innominates appear aligned, Mild ttp L glute med.   able to hold pelvic floor contraction 10s and relax on command, able to perfrom with hip abduction exercise..  See Exercise, Manual, and Modality Logs for complete treatment. Internal pelvic health consent obtained and declined need for second person in room or C/P to internal pelvic exam.   Assessment/Plan  Significant improvement in self care tolerance/activity tolerance and with pelvic floor mobility/tone noted; progressing toward goals overall with improved motor control noted. Needs to continue a short duration (one more visit) to ensure HEP independence.  Patient was instructed in clinical findings of today's encounter, goals and plan of care, updated HEP, and potential and expected response to Rx; also reviewed current rec ex perfromnace, guidelines for safe exercise perfromance and to ensure proper lumbopelvic alignment. Patient expressed understanding of instruction given.           Visit Diagnoses:    ICD-10-CM ICD-9-CM   1. Pelvic and perineal pain  R10.2 625.9   2. Pain of pelvic girdle  R10.2 [id number]   3. SAADIA (stress urinary incontinence, female)  N39.3 625.6   4.  Pelvic floor weakness  N81.89 618.89       Progress toward previous goals: Partially Met  Goals  Short Term Goals:  1) Outcome measure (Female NIH-CPSI score) score  < 15/44 to represent overall improved function- 13/44 as of 3/9; MET  2) Report >25% improvement in pain with sitting MET  3) Independent in short term home exercise program and activity modification to improve symptoms MET    Long Term Goals (to be achieved within 90 days)   1) Outcome measure Female NIH-CPSI score) score  < 12/44 to represent overall improved function - 13/44 as of 3/9; MET  2) > good lumbopelvic muscle coordination to represent improved tolerance for sitting, care of infant, and sleep positions of sleep MET  3) Patient to report >75% overall improvement in pain and leaking of urine MET  4) Patient independent in long term home exercise program and ADL modifications - progressing, needs continued progression  Recommendations: Continue as planned; anticipate discharge after next 1-2 Rxs once HEP independence/updates achieved      Recommendations: Continue as planned  Timeframe: 1 month  Prognosis to achieve goals: good    PT Signature: RADHA Vega, PT , DPT, WCS (electronically signed) [place] License #[id number]      Based upon review of the patient's progress and continued therapy plan, it is my medical opinion that Tosha Thurston should continue physical therapy treatment at [facility] PH[facility] THERAP[facility]  [address] [place] [id number] [phone].    Signature: __________________________________  Referring, Self    Timed:  Manual Therapy:         mins  95263;  Therapeutic Exercise:   15      mins  92141;     Neuromuscular Anaya:  15      mins  10332;    Therapeutic Activity:    10      mins  96434;     Gait Training:           mins  95620;     Ultrasound:          mins  77453;    Electrical Stimulation:         mins  53450 ( );  Ultrasound:          mins  63188;    Ionto                                    mins   93765  Self Care                            mins   50860    Untimed:  Electrical Stimulation:         mins  58541 ( );  Mechanical Traction:         mins  98562;   Dry Needling          mins self-pay  Low Eval          Mins  87023  Mod Eval          Mins  81854  High Eval                            Mins  12755  Re-Eval                               mins  41631    Timed Treatment:  40    mins   Total Treatment:    40    mins

## 2022-06-15 ENCOUNTER — TREATMENT (OUTPATIENT)
Dept: PHYSICAL THERAPY | Facility: CLINIC | Age: 32
End: 2022-06-15

## 2022-06-15 DIAGNOSIS — N81.89 PELVIC FLOOR WEAKNESS: ICD-10-CM

## 2022-06-15 DIAGNOSIS — R10.2 PELVIC AND PERINEAL PAIN: Primary | ICD-10-CM

## 2022-06-15 DIAGNOSIS — N39.3 SUI (STRESS URINARY INCONTINENCE, FEMALE): ICD-10-CM

## 2022-06-15 DIAGNOSIS — R10.2 PAIN OF PELVIC GIRDLE: ICD-10-CM

## 2022-06-15 PROCEDURE — 97535 SELF CARE MNGMENT TRAINING: CPT | Performed by: PHYSICAL THERAPIST

## 2022-06-15 PROCEDURE — 97112 NEUROMUSCULAR REEDUCATION: CPT | Performed by: PHYSICAL THERAPIST

## 2022-06-15 PROCEDURE — 97530 THERAPEUTIC ACTIVITIES: CPT | Performed by: PHYSICAL THERAPIST

## 2022-06-15 PROCEDURE — 97110 THERAPEUTIC EXERCISES: CPT | Performed by: PHYSICAL THERAPIST

## 2022-06-15 NOTE — PROGRESS NOTES
PHYSICAL THERAPY DAILY PROGRESS NOTE    Patient: Tosha Thurston   : 1990  Diagnosis/ICD-10 Code:  Pelvic and perineal pain [R10.2]  Referring practitioner: Self Referring  Date of Initial Visit: Type: THERAPY  Noted: 10/27/2021  Today's Date: 6/15/2022  Patient seen for 22 sessions           Subjective: Continuing to not have pain with intercourse, able to work and take care of child with minimal to no pain, no leaking of urine, feels goal have been met, agreeable to discharge from PT.     Objective:      See Exercise, Manual, and Modality Logs for complete treatment.     Patient was educated in long term/updated HEP, expected and potential response to Rx, and goals and plan were reviewed with the patient expressing understanding/agreement. Instructed in/reviewed proper lumbopelvic positioning and activation with work activities, ADLs, and self care and guidelines for safe progression of recreational exercise.    Assessment/Plan Appropriate to discharge due to all goals met and due to proficience with HEP.  Today's treatment focused on ensuring proficience with HEP PT to continue progress towards goals and to address symptoms limiting function.     Plan:  Plan: Discharge PT; discharge summary to follow.    Visit Diagnoses:    ICD-10-CM ICD-9-CM   1. Pelvic and perineal pain  R10.2 625.9   2. Pain of pelvic girdle  R10.2 [id number]   3. SAADIA (stress urinary incontinence, female)  N39.3 625.6   4. Pelvic floor weakness  N81.89 618.89       Timed:  Manual Therapy:         mins  12976;  Therapeutic Exercise:    10     mins  14154;     Neuromuscular Anaya:   16     mins  09825;    Therapeutic Activity:     17     mins  37050;     Gait Training:           mins  00367;     Ultrasound:          mins  36245;    Electrical Stimulation:         mins  19549 ( );  Self-care  __10__ mins 27573    Untimed:  Electrical Stimulation:         mins  69321 (  );  Mechanical Traction:         mins  15333;   Dry Needling          mins self-pay  Traction          mins 20338  Low Eval          Mins  69315  Mod Eval          Mins  57148  High Eval                            Mins  05582  Re-Eval                               mins  78935    Timed Treatment:  53    mins   Total Treatment:    53    mins    PT SIGNATURE: RADHA Vega PT, DPT, WCS (electronically signed) [place] License #[id number]  DATE: 6/15/2022

## 2022-07-20 ENCOUNTER — DOCUMENTATION (OUTPATIENT)
Dept: PHYSICAL THERAPY | Facility: CLINIC | Age: 32
End: 2022-07-20

## 2022-07-20 NOTE — PROGRESS NOTES
Discharge Summary  Discharge Summary from Physical Therapy Report      Dates  PT visit: 10/27/21 - 6/15/22  Number of Visits:  22    Principal Treatments Provided: manual therapy, self care/pt ed, neuromuscular reeducation, therapeutic exercise, therapeutic activities, modalities as needed          Goals: All Met    Prognosis Good given status at most recent encounter    Comments/ Objective Status: See note from most recent progress note 6/8/22 and most recent treatment 6/15/22 for status upon discharge.    Reason for plan status: Patient achieved goals          Plan of care: Continue with current home exercise program as instructed      Date of Discharge 7/20/22        RADHA Vega, PT, DPT, WCS  Physical Therapist